# Patient Record
Sex: MALE | Race: WHITE | NOT HISPANIC OR LATINO | Employment: OTHER | URBAN - METROPOLITAN AREA
[De-identification: names, ages, dates, MRNs, and addresses within clinical notes are randomized per-mention and may not be internally consistent; named-entity substitution may affect disease eponyms.]

---

## 2017-01-01 ENCOUNTER — HOSPITAL ENCOUNTER (INPATIENT)
Facility: HOSPITAL | Age: 82
LOS: 4 days | Discharge: ANOTHER HOSPITAL | DRG: 177 | End: 2017-05-01
Attending: EMERGENCY MEDICINE | Admitting: FAMILY MEDICINE
Payer: MEDICARE

## 2017-01-01 ENCOUNTER — TELEPHONE (OUTPATIENT)
Dept: FAMILY MEDICINE | Facility: CLINIC | Age: 82
End: 2017-01-01

## 2017-01-01 ENCOUNTER — PATIENT MESSAGE (OUTPATIENT)
Dept: FAMILY MEDICINE | Facility: CLINIC | Age: 82
End: 2017-01-01

## 2017-01-01 ENCOUNTER — CLINICAL SUPPORT (OUTPATIENT)
Dept: FAMILY MEDICINE | Facility: CLINIC | Age: 82
End: 2017-01-01
Payer: MEDICARE

## 2017-01-01 ENCOUNTER — HOSPITAL ENCOUNTER (INPATIENT)
Facility: HOSPITAL | Age: 82
LOS: 5 days | DRG: 189 | End: 2017-05-06
Attending: HOSPITALIST | Admitting: HOSPITALIST
Payer: MEDICARE

## 2017-01-01 ENCOUNTER — OFFICE VISIT (OUTPATIENT)
Dept: FAMILY MEDICINE | Facility: CLINIC | Age: 82
End: 2017-01-01
Payer: MEDICARE

## 2017-01-01 VITALS
HEART RATE: 68 BPM | DIASTOLIC BLOOD PRESSURE: 70 MMHG | BODY MASS INDEX: 25.12 KG/M2 | SYSTOLIC BLOOD PRESSURE: 126 MMHG | WEIGHT: 185.44 LBS | RESPIRATION RATE: 16 BRPM | HEIGHT: 72 IN

## 2017-01-01 VITALS
HEART RATE: 77 BPM | DIASTOLIC BLOOD PRESSURE: 55 MMHG | SYSTOLIC BLOOD PRESSURE: 97 MMHG | WEIGHT: 182.13 LBS | OXYGEN SATURATION: 58 % | HEIGHT: 72 IN | BODY MASS INDEX: 24.67 KG/M2 | RESPIRATION RATE: 17 BRPM | TEMPERATURE: 98 F

## 2017-01-01 VITALS
HEIGHT: 72 IN | WEIGHT: 190 LBS | BODY MASS INDEX: 25.73 KG/M2 | RESPIRATION RATE: 30 BRPM | TEMPERATURE: 96 F | HEART RATE: 66 BPM | DIASTOLIC BLOOD PRESSURE: 74 MMHG | SYSTOLIC BLOOD PRESSURE: 146 MMHG | OXYGEN SATURATION: 91 %

## 2017-01-01 DIAGNOSIS — J96.01 ACUTE RESPIRATORY FAILURE WITH HYPOXIA: ICD-10-CM

## 2017-01-01 DIAGNOSIS — J18.9 PNEUMONIA OF RIGHT LUNG DUE TO INFECTIOUS ORGANISM, UNSPECIFIED PART OF LUNG: ICD-10-CM

## 2017-01-01 DIAGNOSIS — R00.0 TACHYCARDIA: ICD-10-CM

## 2017-01-01 DIAGNOSIS — R79.89 ELEVATED D-DIMER: ICD-10-CM

## 2017-01-01 DIAGNOSIS — M48.061 LUMBAR STENOSIS: ICD-10-CM

## 2017-01-01 DIAGNOSIS — I10 ESSENTIAL HYPERTENSION: Primary | ICD-10-CM

## 2017-01-01 DIAGNOSIS — J18.9 PNEUMONIA, ORGANISM UNSPECIFIED(486): ICD-10-CM

## 2017-01-01 DIAGNOSIS — R60.0 LEG EDEMA: ICD-10-CM

## 2017-01-01 DIAGNOSIS — R10.9 ABDOMINAL PAIN: ICD-10-CM

## 2017-01-01 DIAGNOSIS — R06.02 SOB (SHORTNESS OF BREATH): ICD-10-CM

## 2017-01-01 DIAGNOSIS — J96.90 RESPIRATORY FAILURE: ICD-10-CM

## 2017-01-01 DIAGNOSIS — E87.1 HYPONATREMIA: Primary | ICD-10-CM

## 2017-01-01 DIAGNOSIS — Z12.5 SCREENING FOR PROSTATE CANCER: ICD-10-CM

## 2017-01-01 DIAGNOSIS — I50.9 CHF (CONGESTIVE HEART FAILURE): ICD-10-CM

## 2017-01-01 DIAGNOSIS — I10 ESSENTIAL HYPERTENSION: ICD-10-CM

## 2017-01-01 DIAGNOSIS — J18.9 PNEUMONIA OF RIGHT LUNG DUE TO INFECTIOUS ORGANISM: ICD-10-CM

## 2017-01-01 LAB
ALBUMIN SERPL BCP-MCNC: 2.3 G/DL
ALBUMIN SERPL BCP-MCNC: 2.4 G/DL
ALBUMIN SERPL BCP-MCNC: 2.4 G/DL
ALBUMIN SERPL BCP-MCNC: 2.5 G/DL
ALBUMIN SERPL BCP-MCNC: 2.6 G/DL
ALBUMIN SERPL BCP-MCNC: 2.8 G/DL
ALBUMIN SERPL BCP-MCNC: 3.8 G/DL
ALLENS TEST: ABNORMAL
ALP SERPL-CCNC: 119 U/L
ALP SERPL-CCNC: 121 U/L
ALP SERPL-CCNC: 122 U/L
ALP SERPL-CCNC: 128 U/L
ALP SERPL-CCNC: 129 U/L
ALP SERPL-CCNC: 130 U/L
ALP SERPL-CCNC: 135 U/L
ALP SERPL-CCNC: 139 U/L
ALP SERPL-CCNC: 141 U/L
ALP SERPL-CCNC: 158 U/L
ALP SERPL-CCNC: 161 U/L
ALP SERPL-CCNC: 178 U/L
ALP SERPL-CCNC: 59 U/L
ALT SERPL W/O P-5'-P-CCNC: 105 U/L
ALT SERPL W/O P-5'-P-CCNC: 118 U/L
ALT SERPL W/O P-5'-P-CCNC: 119 U/L
ALT SERPL W/O P-5'-P-CCNC: 126 U/L
ALT SERPL W/O P-5'-P-CCNC: 133 U/L
ALT SERPL W/O P-5'-P-CCNC: 137 U/L
ALT SERPL W/O P-5'-P-CCNC: 190 U/L
ALT SERPL W/O P-5'-P-CCNC: 23 U/L
ALT SERPL W/O P-5'-P-CCNC: 54 U/L
ALT SERPL W/O P-5'-P-CCNC: 54 U/L
ALT SERPL W/O P-5'-P-CCNC: 71 U/L
ALT SERPL W/O P-5'-P-CCNC: 82 U/L
ALT SERPL W/O P-5'-P-CCNC: 95 U/L
ANA SER QL IF: NORMAL
ANCA AB TITR SER IF: NORMAL TITER
ANION GAP SERPL CALC-SCNC: 11 MMOL/L
ANION GAP SERPL CALC-SCNC: 12 MMOL/L
ANION GAP SERPL CALC-SCNC: 13 MMOL/L
ANION GAP SERPL CALC-SCNC: 8 MMOL/L
ANION GAP SERPL CALC-SCNC: 9 MMOL/L
ANION GAP SERPL CALC-SCNC: 9 MMOL/L
ANISOCYTOSIS BLD QL SMEAR: SLIGHT
ANTI-SSA ANTIBODY: 1.38 EU
ANTI-SSA INTERPRETATION: NEGATIVE
ANTI-SSB ANTIBODY: 0.18 EU
ANTI-SSB INTERPRETATION: NEGATIVE
APTT BLDCRRT: 26.2 SEC
APTT BLDCRRT: 27.9 SEC
AST SERPL-CCNC: 27 U/L
AST SERPL-CCNC: 27 U/L
AST SERPL-CCNC: 37 U/L
AST SERPL-CCNC: 39 U/L
AST SERPL-CCNC: 40 U/L
AST SERPL-CCNC: 42 U/L
AST SERPL-CCNC: 43 U/L
AST SERPL-CCNC: 51 U/L
AST SERPL-CCNC: 55 U/L
AST SERPL-CCNC: 68 U/L
AST SERPL-CCNC: 74 U/L
BACTERIA BLD CULT: NORMAL
BASOPHILS # BLD AUTO: 0.02 K/UL
BASOPHILS # BLD AUTO: 0.03 K/UL
BASOPHILS # BLD AUTO: ABNORMAL K/UL
BASOPHILS NFR BLD: 0 %
BASOPHILS NFR BLD: 0.1 %
BASOPHILS NFR BLD: 0.2 %
BILIRUB DIRECT SERPL-MCNC: 0.3 MG/DL
BILIRUB DIRECT SERPL-MCNC: 0.4 MG/DL
BILIRUB DIRECT SERPL-MCNC: 0.4 MG/DL
BILIRUB DIRECT SERPL-MCNC: 0.5 MG/DL
BILIRUB SERPL-MCNC: 0.5 MG/DL
BILIRUB SERPL-MCNC: 0.5 MG/DL
BILIRUB SERPL-MCNC: 0.6 MG/DL
BILIRUB SERPL-MCNC: 0.8 MG/DL
BILIRUB SERPL-MCNC: 0.9 MG/DL
BILIRUB SERPL-MCNC: 1 MG/DL
BILIRUB UR QL STRIP: NEGATIVE
BILIRUB UR QL STRIP: NEGATIVE
BNP SERPL-MCNC: 344 PG/ML
BNP SERPL-MCNC: 491 PG/ML
BNP SERPL-MCNC: 559 PG/ML
BUN SERPL-MCNC: 11 MG/DL
BUN SERPL-MCNC: 13 MG/DL
BUN SERPL-MCNC: 16 MG/DL
BUN SERPL-MCNC: 18 MG/DL
BUN SERPL-MCNC: 21 MG/DL
BUN SERPL-MCNC: 22 MG/DL
BUN SERPL-MCNC: 23 MG/DL
BUN SERPL-MCNC: 25 MG/DL
BUN SERPL-MCNC: 26 MG/DL
BUN SERPL-MCNC: 27 MG/DL
BUN SERPL-MCNC: 28 MG/DL
BUN SERPL-MCNC: 31 MG/DL
CALCIUM SERPL-MCNC: 7.7 MG/DL
CALCIUM SERPL-MCNC: 7.9 MG/DL
CALCIUM SERPL-MCNC: 8 MG/DL
CALCIUM SERPL-MCNC: 8.1 MG/DL
CALCIUM SERPL-MCNC: 8.2 MG/DL
CALCIUM SERPL-MCNC: 8.3 MG/DL
CALCIUM SERPL-MCNC: 8.3 MG/DL
CALCIUM SERPL-MCNC: 8.4 MG/DL
CALCIUM SERPL-MCNC: 8.5 MG/DL
CALCIUM SERPL-MCNC: 8.6 MG/DL
CALCIUM SERPL-MCNC: 8.8 MG/DL
CALCIUM SERPL-MCNC: 9.4 MG/DL
CCP AB SER IA-ACNC: <0.5 U/ML
CHLORIDE SERPL-SCNC: 103 MMOL/L
CHLORIDE SERPL-SCNC: 83 MMOL/L
CHLORIDE SERPL-SCNC: 84 MMOL/L
CHLORIDE SERPL-SCNC: 85 MMOL/L
CHLORIDE SERPL-SCNC: 88 MMOL/L
CHLORIDE SERPL-SCNC: 89 MMOL/L
CHLORIDE SERPL-SCNC: 91 MMOL/L
CHLORIDE SERPL-SCNC: 91 MMOL/L
CHLORIDE SERPL-SCNC: 92 MMOL/L
CHOLEST/HDLC SERPL: 5.7 {RATIO}
CK MB SERPL-MCNC: 3.3 NG/ML
CK MB SERPL-RTO: 3.4 %
CK SERPL-CCNC: 42 U/L
CK SERPL-CCNC: 97 U/L
CK SERPL-CCNC: 97 U/L
CLARITY UR: CLEAR
CLARITY UR: CLEAR
CO2 SERPL-SCNC: 20 MMOL/L
CO2 SERPL-SCNC: 21 MMOL/L
CO2 SERPL-SCNC: 21 MMOL/L
CO2 SERPL-SCNC: 22 MMOL/L
CO2 SERPL-SCNC: 23 MMOL/L
CO2 SERPL-SCNC: 24 MMOL/L
CO2 SERPL-SCNC: 25 MMOL/L
CO2 SERPL-SCNC: 26 MMOL/L
CO2 SERPL-SCNC: 26 MMOL/L
CO2 SERPL-SCNC: 27 MMOL/L
CO2 SERPL-SCNC: 27 MMOL/L
CO2 SERPL-SCNC: 28 MMOL/L
COLOR UR: YELLOW
COLOR UR: YELLOW
COMPLEXED PSA SERPL-MCNC: 5.3 NG/ML
CREAT SERPL-MCNC: 0.8 MG/DL
CREAT SERPL-MCNC: 0.8 MG/DL
CREAT SERPL-MCNC: 0.9 MG/DL
CREAT SERPL-MCNC: 1 MG/DL
CREAT SERPL-MCNC: 1 MG/DL
CREAT SERPL-MCNC: 1.2 MG/DL
CREAT SERPL-MCNC: 1.2 MG/DL
CREAT SERPL-MCNC: 1.3 MG/DL
CREAT UR-MCNC: 100.4 MG/DL
CREAT UR-MCNC: 9 MG/DL
D DIMER PPP IA.FEU-MCNC: 1.57 MG/L FEU
DACRYOCYTES BLD QL SMEAR: ABNORMAL
DACRYOCYTES BLD QL SMEAR: ABNORMAL
DELSYS: ABNORMAL
DIASTOLIC DYSFUNCTION: YES
DIFFERENTIAL METHOD: ABNORMAL
DSDNA AB SER-ACNC: NORMAL [IU]/ML
ENA JO1 AB SER IA-ACNC: <1 INDEX
EOSINOPHIL # BLD AUTO: 0 K/UL
EOSINOPHIL # BLD AUTO: 0.1 K/UL
EOSINOPHIL # BLD AUTO: 0.2 K/UL
EOSINOPHIL # BLD AUTO: ABNORMAL K/UL
EOSINOPHIL NFR BLD: 0 %
EOSINOPHIL NFR BLD: 0.1 %
EOSINOPHIL NFR BLD: 0.2 %
EOSINOPHIL NFR BLD: 1 %
EOSINOPHIL NFR BLD: 1.3 %
EOSINOPHIL NFR BLD: 2 %
ERYTHROCYTE [DISTWIDTH] IN BLOOD BY AUTOMATED COUNT: 11.2 %
ERYTHROCYTE [DISTWIDTH] IN BLOOD BY AUTOMATED COUNT: 11.2 %
ERYTHROCYTE [DISTWIDTH] IN BLOOD BY AUTOMATED COUNT: 11.4 %
ERYTHROCYTE [DISTWIDTH] IN BLOOD BY AUTOMATED COUNT: 11.4 %
ERYTHROCYTE [DISTWIDTH] IN BLOOD BY AUTOMATED COUNT: 11.7 %
ERYTHROCYTE [DISTWIDTH] IN BLOOD BY AUTOMATED COUNT: 11.9 %
ERYTHROCYTE [DISTWIDTH] IN BLOOD BY AUTOMATED COUNT: 11.9 %
ERYTHROCYTE [DISTWIDTH] IN BLOOD BY AUTOMATED COUNT: 12 %
ERYTHROCYTE [DISTWIDTH] IN BLOOD BY AUTOMATED COUNT: 12.1 %
ERYTHROCYTE [DISTWIDTH] IN BLOOD BY AUTOMATED COUNT: 12.1 %
EST. GFR  (AFRICAN AMERICAN): 57 ML/MIN/1.73 M^2
EST. GFR  (AFRICAN AMERICAN): >60 ML/MIN/1.73 M^2
EST. GFR  (NON AFRICAN AMERICAN): 49 ML/MIN/1.73 M^2
EST. GFR  (NON AFRICAN AMERICAN): 54 ML/MIN/1.73 M^2
EST. GFR  (NON AFRICAN AMERICAN): 54 ML/MIN/1.73 M^2
EST. GFR  (NON AFRICAN AMERICAN): >60 ML/MIN/1.73 M^2
ESTIMATED PA SYSTOLIC PRESSURE: 35.28
GLUCOSE SERPL-MCNC: 105 MG/DL
GLUCOSE SERPL-MCNC: 107 MG/DL
GLUCOSE SERPL-MCNC: 109 MG/DL
GLUCOSE SERPL-MCNC: 112 MG/DL
GLUCOSE SERPL-MCNC: 112 MG/DL
GLUCOSE SERPL-MCNC: 115 MG/DL
GLUCOSE SERPL-MCNC: 117 MG/DL
GLUCOSE SERPL-MCNC: 140 MG/DL
GLUCOSE SERPL-MCNC: 142 MG/DL
GLUCOSE SERPL-MCNC: 205 MG/DL
GLUCOSE SERPL-MCNC: 79 MG/DL
GLUCOSE SERPL-MCNC: 83 MG/DL
GLUCOSE SERPL-MCNC: 89 MG/DL
GLUCOSE SERPL-MCNC: 90 MG/DL
GLUCOSE UR QL STRIP: NEGATIVE
GLUCOSE UR QL STRIP: NEGATIVE
HCO3 UR-SCNC: 22.6 MMOL/L (ref 24–28)
HCT VFR BLD AUTO: 27.6 %
HCT VFR BLD AUTO: 28.5 %
HCT VFR BLD AUTO: 28.8 %
HCT VFR BLD AUTO: 28.8 %
HCT VFR BLD AUTO: 30.4 %
HCT VFR BLD AUTO: 30.4 %
HCT VFR BLD AUTO: 31.2 %
HCT VFR BLD AUTO: 31.7 %
HCT VFR BLD AUTO: 31.9 %
HCT VFR BLD AUTO: 32 %
HCT VFR BLD AUTO: 34.2 %
HCT VFR BLD AUTO: 36.3 %
HDL/CHOLESTEROL RATIO: 17.6 %
HDLC SERPL-MCNC: 261 MG/DL
HDLC SERPL-MCNC: 46 MG/DL
HGB BLD-MCNC: 10.1 G/DL
HGB BLD-MCNC: 10.3 G/DL
HGB BLD-MCNC: 10.5 G/DL
HGB BLD-MCNC: 10.6 G/DL
HGB BLD-MCNC: 10.8 G/DL
HGB BLD-MCNC: 11 G/DL
HGB BLD-MCNC: 11.1 G/DL
HGB BLD-MCNC: 11.3 G/DL
HGB BLD-MCNC: 12.2 G/DL
HGB BLD-MCNC: 12.9 G/DL
HGB UR QL STRIP: NEGATIVE
HGB UR QL STRIP: NEGATIVE
HIV 1+2 AB+HIV1 P24 AG SERPL QL IA: NEGATIVE
HYPOCHROMIA BLD QL SMEAR: ABNORMAL
INR PPP: 1.1
INR PPP: 1.1
KETONES UR QL STRIP: NEGATIVE
KETONES UR QL STRIP: NEGATIVE
L PNEUMO AG UR QL IA: NOT DETECTED
LACTATE SERPL-SCNC: 2.6 MMOL/L
LDLC SERPL CALC-MCNC: 189.2 MG/DL
LEUKOCYTE ESTERASE UR QL STRIP: NEGATIVE
LEUKOCYTE ESTERASE UR QL STRIP: NEGATIVE
LYMPHOCYTES # BLD AUTO: 1.4 K/UL
LYMPHOCYTES # BLD AUTO: 1.9 K/UL
LYMPHOCYTES # BLD AUTO: 2.4 K/UL
LYMPHOCYTES # BLD AUTO: 2.6 K/UL
LYMPHOCYTES # BLD AUTO: 3.1 K/UL
LYMPHOCYTES # BLD AUTO: ABNORMAL K/UL
LYMPHOCYTES NFR BLD: 1 %
LYMPHOCYTES NFR BLD: 11.3 %
LYMPHOCYTES NFR BLD: 12 %
LYMPHOCYTES NFR BLD: 14.3 %
LYMPHOCYTES NFR BLD: 16 %
LYMPHOCYTES NFR BLD: 17 %
LYMPHOCYTES NFR BLD: 3 %
LYMPHOCYTES NFR BLD: 6 %
LYMPHOCYTES NFR BLD: 7 %
LYMPHOCYTES NFR BLD: 9 %
LYMPHOCYTES NFR BLD: 9.4 %
LYMPHOCYTES NFR BLD: 9.8 %
MAGNESIUM SERPL-MCNC: 1.8 MG/DL
MAGNESIUM SERPL-MCNC: 1.9 MG/DL
MAGNESIUM SERPL-MCNC: 1.9 MG/DL
MAGNESIUM SERPL-MCNC: 2 MG/DL
MAGNESIUM SERPL-MCNC: 2.1 MG/DL
MAGNESIUM SERPL-MCNC: 2.1 MG/DL
MAGNESIUM SERPL-MCNC: 2.3 MG/DL
MCH RBC QN AUTO: 31.2 PG
MCH RBC QN AUTO: 31.3 PG
MCH RBC QN AUTO: 31.4 PG
MCH RBC QN AUTO: 31.4 PG
MCH RBC QN AUTO: 31.7 PG
MCH RBC QN AUTO: 31.7 PG
MCH RBC QN AUTO: 31.9 PG
MCH RBC QN AUTO: 32 PG
MCHC RBC AUTO-ENTMCNC: 35 %
MCHC RBC AUTO-ENTMCNC: 35.3 %
MCHC RBC AUTO-ENTMCNC: 35.4 %
MCHC RBC AUTO-ENTMCNC: 35.5 %
MCHC RBC AUTO-ENTMCNC: 35.5 %
MCHC RBC AUTO-ENTMCNC: 35.7 %
MCHC RBC AUTO-ENTMCNC: 36.1 %
MCHC RBC AUTO-ENTMCNC: 36.2 %
MCHC RBC AUTO-ENTMCNC: 36.2 %
MCHC RBC AUTO-ENTMCNC: 36.5 %
MCHC RBC AUTO-ENTMCNC: 36.6 %
MCHC RBC AUTO-ENTMCNC: 36.8 %
MCV RBC AUTO: 87 FL
MCV RBC AUTO: 88 FL
MCV RBC AUTO: 89 FL
MCV RBC AUTO: 90 FL
MONOCYTES # BLD AUTO: 1.4 K/UL
MONOCYTES # BLD AUTO: 2.1 K/UL
MONOCYTES # BLD AUTO: 2.2 K/UL
MONOCYTES # BLD AUTO: 2.4 K/UL
MONOCYTES # BLD AUTO: 2.7 K/UL
MONOCYTES # BLD AUTO: ABNORMAL K/UL
MONOCYTES NFR BLD: 0 %
MONOCYTES NFR BLD: 10 %
MONOCYTES NFR BLD: 11 %
MONOCYTES NFR BLD: 15 %
MONOCYTES NFR BLD: 20.2 %
MONOCYTES NFR BLD: 3 %
MONOCYTES NFR BLD: 6 %
MONOCYTES NFR BLD: 6 %
MONOCYTES NFR BLD: 6.9 %
MONOCYTES NFR BLD: 7.9 %
MONOCYTES NFR BLD: 8.7 %
MONOCYTES NFR BLD: 8.8 %
NEUTROPHILS # BLD AUTO: 17.2 K/UL
NEUTROPHILS # BLD AUTO: 19.7 K/UL
NEUTROPHILS # BLD AUTO: 21.6 K/UL
NEUTROPHILS # BLD AUTO: 22.3 K/UL
NEUTROPHILS # BLD AUTO: 8.7 K/UL
NEUTROPHILS NFR BLD: 65 %
NEUTROPHILS NFR BLD: 65.2 %
NEUTROPHILS NFR BLD: 72 %
NEUTROPHILS NFR BLD: 74 %
NEUTROPHILS NFR BLD: 79.7 %
NEUTROPHILS NFR BLD: 81.2 %
NEUTROPHILS NFR BLD: 82.6 %
NEUTROPHILS NFR BLD: 83 %
NEUTROPHILS NFR BLD: 86 %
NEUTROPHILS NFR BLD: 86 %
NEUTROPHILS NFR BLD: 88 %
NEUTROPHILS NFR BLD: 96 %
NEUTS BAND NFR BLD MANUAL: 1 %
NEUTS BAND NFR BLD MANUAL: 1 %
NEUTS BAND NFR BLD MANUAL: 14 %
NEUTS BAND NFR BLD MANUAL: 2 %
NITRITE UR QL STRIP: NEGATIVE
NITRITE UR QL STRIP: NEGATIVE
NONHDLC SERPL-MCNC: 215 MG/DL
OSMOLALITY SERPL: 254 MOSM/KG
OSMOLALITY UR: 479 MOSM/KG
OVALOCYTES BLD QL SMEAR: ABNORMAL
P-ANCA TITR SER IF: NORMAL TITER
PCO2 BLDA: 31.7 MMHG (ref 35–45)
PH SMN: 7.46 [PH] (ref 7.35–7.45)
PH UR STRIP: 6 [PH] (ref 5–8)
PH UR STRIP: 7 [PH] (ref 5–8)
PHOSPHATE SERPL-MCNC: 3.3 MG/DL
PHOSPHATE SERPL-MCNC: 3.8 MG/DL
PHOSPHATE SERPL-MCNC: 4 MG/DL
PHOSPHATE SERPL-MCNC: 4.1 MG/DL
PHOSPHATE SERPL-MCNC: 4.5 MG/DL
PHOSPHATE SERPL-MCNC: 4.5 MG/DL
PLATELET # BLD AUTO: 302 K/UL
PLATELET # BLD AUTO: 304 K/UL
PLATELET # BLD AUTO: 309 K/UL
PLATELET # BLD AUTO: 327 K/UL
PLATELET # BLD AUTO: 356 K/UL
PLATELET # BLD AUTO: 367 K/UL
PLATELET # BLD AUTO: 370 K/UL
PLATELET # BLD AUTO: 377 K/UL
PLATELET # BLD AUTO: 423 K/UL
PLATELET # BLD AUTO: 434 K/UL
PLATELET # BLD AUTO: 475 K/UL
PLATELET # BLD AUTO: 475 K/UL
PLATELET BLD QL SMEAR: ABNORMAL
PMV BLD AUTO: 8.2 FL
PMV BLD AUTO: 8.3 FL
PMV BLD AUTO: 8.3 FL
PMV BLD AUTO: 8.4 FL
PMV BLD AUTO: 8.5 FL
PMV BLD AUTO: 8.6 FL
PMV BLD AUTO: 8.6 FL
PMV BLD AUTO: 8.7 FL
PO2 BLDA: 47 MMHG (ref 80–100)
POC BE: -1 MMOL/L
POC SATURATED O2: 86 % (ref 95–100)
POC TCO2: 24 MMOL/L (ref 23–27)
POIKILOCYTOSIS BLD QL SMEAR: SLIGHT
POTASSIUM SERPL-SCNC: 3.3 MMOL/L
POTASSIUM SERPL-SCNC: 3.3 MMOL/L
POTASSIUM SERPL-SCNC: 3.6 MMOL/L
POTASSIUM SERPL-SCNC: 3.6 MMOL/L
POTASSIUM SERPL-SCNC: 3.7 MMOL/L
POTASSIUM SERPL-SCNC: 3.7 MMOL/L
POTASSIUM SERPL-SCNC: 3.9 MMOL/L
POTASSIUM SERPL-SCNC: 4 MMOL/L
POTASSIUM SERPL-SCNC: 4.1 MMOL/L
POTASSIUM SERPL-SCNC: 4.6 MMOL/L
POTASSIUM SERPL-SCNC: 4.6 MMOL/L
POTASSIUM SERPL-SCNC: 4.7 MMOL/L
POTASSIUM SERPL-SCNC: 4.9 MMOL/L
POTASSIUM SERPL-SCNC: 5.1 MMOL/L
PROT SERPL-MCNC: 5.5 G/DL
PROT SERPL-MCNC: 5.6 G/DL
PROT SERPL-MCNC: 5.8 G/DL
PROT SERPL-MCNC: 5.9 G/DL
PROT SERPL-MCNC: 5.9 G/DL
PROT SERPL-MCNC: 6 G/DL
PROT SERPL-MCNC: 6.1 G/DL
PROT SERPL-MCNC: 6.1 G/DL
PROT SERPL-MCNC: 6.2 G/DL
PROT SERPL-MCNC: 6.5 G/DL
PROT SERPL-MCNC: 6.7 G/DL
PROT UR QL STRIP: NEGATIVE
PROT UR QL STRIP: NEGATIVE
PROTHROMBIN TIME: 10.9 SEC
PROTHROMBIN TIME: 11.2 SEC
RBC # BLD AUTO: 3.17 M/UL
RBC # BLD AUTO: 3.28 M/UL
RBC # BLD AUTO: 3.31 M/UL
RBC # BLD AUTO: 3.31 M/UL
RBC # BLD AUTO: 3.44 M/UL
RBC # BLD AUTO: 3.44 M/UL
RBC # BLD AUTO: 3.53 M/UL
RBC # BLD AUTO: 3.56 M/UL
RBC # BLD AUTO: 3.56 M/UL
RBC # BLD AUTO: 3.61 M/UL
RBC # BLD AUTO: 3.82 M/UL
RBC # BLD AUTO: 4.04 M/UL
RETIRED EF AND QEF - SEE NOTES: 60 (ref 55–65)
RHEUMATOID FACT SERPL-ACNC: 12 IU/ML
SAMPLE: ABNORMAL
SITE: ABNORMAL
SODIUM SERPL-SCNC: 116 MMOL/L
SODIUM SERPL-SCNC: 121 MMOL/L
SODIUM SERPL-SCNC: 122 MMOL/L
SODIUM SERPL-SCNC: 123 MMOL/L
SODIUM SERPL-SCNC: 124 MMOL/L
SODIUM SERPL-SCNC: 124 MMOL/L
SODIUM SERPL-SCNC: 125 MMOL/L
SODIUM SERPL-SCNC: 125 MMOL/L
SODIUM SERPL-SCNC: 127 MMOL/L
SODIUM SERPL-SCNC: 127 MMOL/L
SODIUM SERPL-SCNC: 129 MMOL/L
SODIUM SERPL-SCNC: 138 MMOL/L
SODIUM UR-SCNC: 48 MMOL/L
SP GR UR STRIP: 1.01 (ref 1–1.03)
SP GR UR STRIP: <=1.005 (ref 1–1.03)
SPHEROCYTES BLD QL SMEAR: ABNORMAL
STOMATOCYTES BLD QL SMEAR: PRESENT
TRICUSPID VALVE REGURGITATION: ABNORMAL
TRIGL SERPL-MCNC: 129 MG/DL
TROPONIN I SERPL DL<=0.01 NG/ML-MCNC: 0.01 NG/ML
TROPONIN I SERPL DL<=0.01 NG/ML-MCNC: <0.006 NG/ML
TSH SERPL DL<=0.005 MIU/L-ACNC: 1.61 UIU/ML
TSH SERPL DL<=0.005 MIU/L-ACNC: 3.61 UIU/ML
URN SPEC COLLECT METH UR: ABNORMAL
URN SPEC COLLECT METH UR: NORMAL
UROBILINOGEN UR STRIP-ACNC: 1 EU/DL
UROBILINOGEN UR STRIP-ACNC: 1 EU/DL
VANCOMYCIN TROUGH SERPL-MCNC: 18.3 UG/ML
VANCOMYCIN TROUGH SERPL-MCNC: 22.9 UG/ML
WBC # BLD AUTO: 11.13 K/UL
WBC # BLD AUTO: 12.62 K/UL
WBC # BLD AUTO: 13.55 K/UL
WBC # BLD AUTO: 20 K/UL
WBC # BLD AUTO: 24.61 K/UL
WBC # BLD AUTO: 25.15 K/UL
WBC # BLD AUTO: 27.24 K/UL
WBC # BLD AUTO: 27.37 K/UL
WBC # BLD AUTO: 30.2 K/UL
WBC # BLD AUTO: 33.28 K/UL
WBC # BLD AUTO: 34.45 K/UL
WBC # BLD AUTO: 41.76 K/UL
WBC TOXIC VACUOLES BLD QL SMEAR: PRESENT

## 2017-01-01 PROCEDURE — 83930 ASSAY OF BLOOD OSMOLALITY: CPT

## 2017-01-01 PROCEDURE — 83605 ASSAY OF LACTIC ACID: CPT

## 2017-01-01 PROCEDURE — 85027 COMPLETE CBC AUTOMATED: CPT

## 2017-01-01 PROCEDURE — 97535 SELF CARE MNGMENT TRAINING: CPT

## 2017-01-01 PROCEDURE — 25000242 PHARM REV CODE 250 ALT 637 W/ HCPCS: Performed by: HOSPITALIST

## 2017-01-01 PROCEDURE — 87040 BLOOD CULTURE FOR BACTERIA: CPT | Mod: 59

## 2017-01-01 PROCEDURE — 20000000 HC ICU ROOM

## 2017-01-01 PROCEDURE — G8978 MOBILITY CURRENT STATUS: HCPCS | Mod: CK

## 2017-01-01 PROCEDURE — 85007 BL SMEAR W/DIFF WBC COUNT: CPT

## 2017-01-01 PROCEDURE — 25000003 PHARM REV CODE 250: Performed by: INTERNAL MEDICINE

## 2017-01-01 PROCEDURE — 1160F RVW MEDS BY RX/DR IN RCRD: CPT | Performed by: FAMILY MEDICINE

## 2017-01-01 PROCEDURE — 25000003 PHARM REV CODE 250: Performed by: FAMILY MEDICINE

## 2017-01-01 PROCEDURE — 36415 COLL VENOUS BLD VENIPUNCTURE: CPT

## 2017-01-01 PROCEDURE — 81003 URINALYSIS AUTO W/O SCOPE: CPT

## 2017-01-01 PROCEDURE — 27000221 HC OXYGEN, UP TO 24 HOURS

## 2017-01-01 PROCEDURE — 83880 ASSAY OF NATRIURETIC PEPTIDE: CPT

## 2017-01-01 PROCEDURE — 84100 ASSAY OF PHOSPHORUS: CPT

## 2017-01-01 PROCEDURE — 85730 THROMBOPLASTIN TIME PARTIAL: CPT

## 2017-01-01 PROCEDURE — 63600175 PHARM REV CODE 636 W HCPCS: Performed by: NURSE PRACTITIONER

## 2017-01-01 PROCEDURE — 11000001 HC ACUTE MED/SURG PRIVATE ROOM

## 2017-01-01 PROCEDURE — 63600175 PHARM REV CODE 636 W HCPCS: Performed by: INTERNAL MEDICINE

## 2017-01-01 PROCEDURE — 80076 HEPATIC FUNCTION PANEL: CPT

## 2017-01-01 PROCEDURE — 84484 ASSAY OF TROPONIN QUANT: CPT

## 2017-01-01 PROCEDURE — 27000339 *HC DAILY SUPPLY KIT

## 2017-01-01 PROCEDURE — 86235 NUCLEAR ANTIGEN ANTIBODY: CPT

## 2017-01-01 PROCEDURE — 93005 ELECTROCARDIOGRAM TRACING: CPT

## 2017-01-01 PROCEDURE — 94640 AIRWAY INHALATION TREATMENT: CPT

## 2017-01-01 PROCEDURE — 25000003 PHARM REV CODE 250: Performed by: HOSPITALIST

## 2017-01-01 PROCEDURE — 96367 TX/PROPH/DG ADDL SEQ IV INF: CPT

## 2017-01-01 PROCEDURE — 99900035 HC TECH TIME PER 15 MIN (STAT)

## 2017-01-01 PROCEDURE — 27000190 HC CPAP FULL FACE MASK W/VALVE

## 2017-01-01 PROCEDURE — 94660 CPAP INITIATION&MGMT: CPT

## 2017-01-01 PROCEDURE — 25000003 PHARM REV CODE 250: Performed by: EMERGENCY MEDICINE

## 2017-01-01 PROCEDURE — 85379 FIBRIN DEGRADATION QUANT: CPT

## 2017-01-01 PROCEDURE — 80202 ASSAY OF VANCOMYCIN: CPT

## 2017-01-01 PROCEDURE — 36415 COLL VENOUS BLD VENIPUNCTURE: CPT | Mod: PBBFAC

## 2017-01-01 PROCEDURE — 1157F ADVNC CARE PLAN IN RCRD: CPT | Mod: 8P | Performed by: FAMILY MEDICINE

## 2017-01-01 PROCEDURE — 80053 COMPREHEN METABOLIC PANEL: CPT

## 2017-01-01 PROCEDURE — 94761 N-INVAS EAR/PLS OXIMETRY MLT: CPT

## 2017-01-01 PROCEDURE — 80048 BASIC METABOLIC PNL TOTAL CA: CPT

## 2017-01-01 PROCEDURE — 99214 OFFICE O/P EST MOD 30 MIN: CPT | Mod: S$PBB | Performed by: FAMILY MEDICINE

## 2017-01-01 PROCEDURE — 82553 CREATINE MB FRACTION: CPT

## 2017-01-01 PROCEDURE — 97161 PT EVAL LOW COMPLEX 20 MIN: CPT

## 2017-01-01 PROCEDURE — 80048 BASIC METABOLIC PNL TOTAL CA: CPT | Mod: 91

## 2017-01-01 PROCEDURE — 25000003 PHARM REV CODE 250: Performed by: NURSE PRACTITIONER

## 2017-01-01 PROCEDURE — 27100171 HC OXYGEN HIGH FLOW UP TO 24 HOURS

## 2017-01-01 PROCEDURE — 84484 ASSAY OF TROPONIN QUANT: CPT | Mod: 91

## 2017-01-01 PROCEDURE — 63600175 PHARM REV CODE 636 W HCPCS: Performed by: EMERGENCY MEDICINE

## 2017-01-01 PROCEDURE — 85025 COMPLETE CBC W/AUTO DIFF WBC: CPT

## 2017-01-01 PROCEDURE — 85027 COMPLETE CBC AUTOMATED: CPT | Mod: 91

## 2017-01-01 PROCEDURE — 83735 ASSAY OF MAGNESIUM: CPT

## 2017-01-01 PROCEDURE — 25000242 PHARM REV CODE 250 ALT 637 W/ HCPCS: Performed by: INTERNAL MEDICINE

## 2017-01-01 PROCEDURE — 99233 SBSQ HOSP IP/OBS HIGH 50: CPT | Mod: ,,, | Performed by: INTERNAL MEDICINE

## 2017-01-01 PROCEDURE — 63600175 PHARM REV CODE 636 W HCPCS: Performed by: HOSPITALIST

## 2017-01-01 PROCEDURE — 99999 PR PBB SHADOW E&M-EST. PATIENT-LVL II: CPT | Mod: PBBFAC,,, | Performed by: FAMILY MEDICINE

## 2017-01-01 PROCEDURE — 82803 BLOOD GASES ANY COMBINATION: CPT

## 2017-01-01 PROCEDURE — 96375 TX/PRO/DX INJ NEW DRUG ADDON: CPT

## 2017-01-01 PROCEDURE — 86703 HIV-1/HIV-2 1 RESULT ANTBDY: CPT

## 2017-01-01 PROCEDURE — C9113 INJ PANTOPRAZOLE SODIUM, VIA: HCPCS | Performed by: INTERNAL MEDICINE

## 2017-01-01 PROCEDURE — 99223 1ST HOSP IP/OBS HIGH 75: CPT | Mod: ,,, | Performed by: FAMILY MEDICINE

## 2017-01-01 PROCEDURE — 63600175 PHARM REV CODE 636 W HCPCS: Performed by: FAMILY MEDICINE

## 2017-01-01 PROCEDURE — 25500020 PHARM REV CODE 255: Performed by: FAMILY MEDICINE

## 2017-01-01 PROCEDURE — 82570 ASSAY OF URINE CREATININE: CPT

## 2017-01-01 PROCEDURE — 96361 HYDRATE IV INFUSION ADD-ON: CPT

## 2017-01-01 PROCEDURE — 99900031 HC PATIENT EDUCATION (STAT)

## 2017-01-01 PROCEDURE — 86255 FLUORESCENT ANTIBODY SCREEN: CPT | Mod: 91

## 2017-01-01 PROCEDURE — 99212 OFFICE O/P EST SF 10 MIN: CPT | Mod: PBBFAC | Performed by: FAMILY MEDICINE

## 2017-01-01 PROCEDURE — 87040 BLOOD CULTURE FOR BACTERIA: CPT

## 2017-01-01 PROCEDURE — 86038 ANTINUCLEAR ANTIBODIES: CPT

## 2017-01-01 PROCEDURE — 86235 NUCLEAR ANTIGEN ANTIBODY: CPT | Mod: 59

## 2017-01-01 PROCEDURE — G0378 HOSPITAL OBSERVATION PER HR: HCPCS

## 2017-01-01 PROCEDURE — 99285 EMERGENCY DEPT VISIT HI MDM: CPT | Mod: 25

## 2017-01-01 PROCEDURE — 86431 RHEUMATOID FACTOR QUANT: CPT

## 2017-01-01 PROCEDURE — 94760 N-INVAS EAR/PLS OXIMETRY 1: CPT

## 2017-01-01 PROCEDURE — 86225 DNA ANTIBODY NATIVE: CPT

## 2017-01-01 PROCEDURE — 51702 INSERT TEMP BLADDER CATH: CPT

## 2017-01-01 PROCEDURE — 87449 NOS EACH ORGANISM AG IA: CPT

## 2017-01-01 PROCEDURE — G8979 MOBILITY GOAL STATUS: HCPCS | Mod: CI

## 2017-01-01 PROCEDURE — 94799 UNLISTED PULMONARY SVC/PX: CPT

## 2017-01-01 PROCEDURE — 84443 ASSAY THYROID STIM HORMONE: CPT

## 2017-01-01 PROCEDURE — 80061 LIPID PANEL: CPT

## 2017-01-01 PROCEDURE — 85610 PROTHROMBIN TIME: CPT

## 2017-01-01 PROCEDURE — 96365 THER/PROPH/DIAG IV INF INIT: CPT

## 2017-01-01 PROCEDURE — 83935 ASSAY OF URINE OSMOLALITY: CPT

## 2017-01-01 PROCEDURE — 1159F MED LIST DOCD IN RCRD: CPT | Performed by: FAMILY MEDICINE

## 2017-01-01 PROCEDURE — 99232 SBSQ HOSP IP/OBS MODERATE 35: CPT | Mod: ,,, | Performed by: INTERNAL MEDICINE

## 2017-01-01 PROCEDURE — 86200 CCP ANTIBODY: CPT

## 2017-01-01 PROCEDURE — 97166 OT EVAL MOD COMPLEX 45 MIN: CPT

## 2017-01-01 PROCEDURE — 85007 BL SMEAR W/DIFF WBC COUNT: CPT | Mod: 91

## 2017-01-01 PROCEDURE — 27200120 HC KIT IV START (RUSH ONLY)

## 2017-01-01 PROCEDURE — 84300 ASSAY OF URINE SODIUM: CPT

## 2017-01-01 PROCEDURE — 83735 ASSAY OF MAGNESIUM: CPT | Mod: 91

## 2017-01-01 PROCEDURE — 82550 ASSAY OF CK (CPK): CPT

## 2017-01-01 PROCEDURE — 84153 ASSAY OF PSA TOTAL: CPT

## 2017-01-01 PROCEDURE — 97110 THERAPEUTIC EXERCISES: CPT | Performed by: PHYSICAL THERAPIST

## 2017-01-01 PROCEDURE — 36600 WITHDRAWAL OF ARTERIAL BLOOD: CPT

## 2017-01-01 PROCEDURE — 93306 TTE W/DOPPLER COMPLETE: CPT

## 2017-01-01 PROCEDURE — 93010 ELECTROCARDIOGRAM REPORT: CPT | Mod: ,,, | Performed by: INTERNAL MEDICINE

## 2017-01-01 RX ORDER — HEPARIN SODIUM 5000 [USP'U]/ML
5000 INJECTION, SOLUTION INTRAVENOUS; SUBCUTANEOUS EVERY 12 HOURS
Status: DISCONTINUED | OUTPATIENT
Start: 2017-01-01 | End: 2017-01-01 | Stop reason: HOSPADM

## 2017-01-01 RX ORDER — FUROSEMIDE 10 MG/ML
20 INJECTION INTRAMUSCULAR; INTRAVENOUS 2 TIMES DAILY
Status: DISCONTINUED | OUTPATIENT
Start: 2017-01-01 | End: 2017-01-01 | Stop reason: HOSPADM

## 2017-01-01 RX ORDER — FUROSEMIDE 10 MG/ML
40 INJECTION INTRAMUSCULAR; INTRAVENOUS
Status: COMPLETED | OUTPATIENT
Start: 2017-01-01 | End: 2017-01-01

## 2017-01-01 RX ORDER — LOSARTAN POTASSIUM 25 MG/1
25 TABLET ORAL DAILY
Status: DISCONTINUED | OUTPATIENT
Start: 2017-01-01 | End: 2017-01-01 | Stop reason: HOSPADM

## 2017-01-01 RX ORDER — LORAZEPAM 1 MG/1
1 TABLET ORAL ONCE
Status: COMPLETED | OUTPATIENT
Start: 2017-01-01 | End: 2017-01-01

## 2017-01-01 RX ORDER — SODIUM CHLORIDE 9 MG/ML
INJECTION, SOLUTION INTRAVENOUS CONTINUOUS
Status: DISCONTINUED | OUTPATIENT
Start: 2017-01-01 | End: 2017-01-01

## 2017-01-01 RX ORDER — IPRATROPIUM BROMIDE AND ALBUTEROL SULFATE 2.5; .5 MG/3ML; MG/3ML
3 SOLUTION RESPIRATORY (INHALATION) EVERY 4 HOURS
Status: DISCONTINUED | OUTPATIENT
Start: 2017-01-01 | End: 2017-01-01 | Stop reason: HOSPADM

## 2017-01-01 RX ORDER — MONTELUKAST SODIUM 10 MG/1
10 TABLET ORAL DAILY
Status: DISCONTINUED | OUTPATIENT
Start: 2017-01-01 | End: 2017-01-01

## 2017-01-01 RX ORDER — LEVALBUTEROL 1.25 MG/.5ML
1.25 SOLUTION, CONCENTRATE RESPIRATORY (INHALATION)
Status: DISCONTINUED | OUTPATIENT
Start: 2017-01-01 | End: 2017-01-01

## 2017-01-01 RX ORDER — BENZONATATE 100 MG/1
100 CAPSULE ORAL 3 TIMES DAILY PRN
Status: DISCONTINUED | OUTPATIENT
Start: 2017-01-01 | End: 2017-01-01 | Stop reason: HOSPADM

## 2017-01-01 RX ORDER — FUROSEMIDE 10 MG/ML
60 INJECTION INTRAMUSCULAR; INTRAVENOUS ONCE
Status: COMPLETED | OUTPATIENT
Start: 2017-01-01 | End: 2017-01-01

## 2017-01-01 RX ORDER — POLYETHYLENE GLYCOL 3350 17 G/17G
17 POWDER, FOR SOLUTION ORAL 2 TIMES DAILY
Status: DISCONTINUED | OUTPATIENT
Start: 2017-01-01 | End: 2017-01-01 | Stop reason: HOSPADM

## 2017-01-01 RX ORDER — MAG HYDROX/ALUMINUM HYD/SIMETH 200-200-20
30 SUSPENSION, ORAL (FINAL DOSE FORM) ORAL EVERY 6 HOURS PRN
Status: DISCONTINUED | OUTPATIENT
Start: 2017-01-01 | End: 2017-01-01 | Stop reason: HOSPADM

## 2017-01-01 RX ORDER — ONDANSETRON 2 MG/ML
4 INJECTION INTRAMUSCULAR; INTRAVENOUS EVERY 6 HOURS PRN
Status: DISCONTINUED | OUTPATIENT
Start: 2017-01-01 | End: 2017-01-01 | Stop reason: HOSPADM

## 2017-01-01 RX ORDER — ENOXAPARIN SODIUM 100 MG/ML
40 INJECTION SUBCUTANEOUS EVERY 24 HOURS
Status: DISCONTINUED | OUTPATIENT
Start: 2017-01-01 | End: 2017-01-01 | Stop reason: HOSPADM

## 2017-01-01 RX ORDER — FUROSEMIDE 10 MG/ML
40 INJECTION INTRAMUSCULAR; INTRAVENOUS 2 TIMES DAILY
Status: DISCONTINUED | OUTPATIENT
Start: 2017-01-01 | End: 2017-01-01

## 2017-01-01 RX ORDER — METHYLPREDNISOLONE SOD SUCC 125 MG
80 VIAL (EA) INJECTION DAILY
Status: DISCONTINUED | OUTPATIENT
Start: 2017-01-01 | End: 2017-01-01 | Stop reason: HOSPADM

## 2017-01-01 RX ORDER — ACETAMINOPHEN 325 MG/1
650 TABLET ORAL EVERY 4 HOURS PRN
Status: DISCONTINUED | OUTPATIENT
Start: 2017-01-01 | End: 2017-01-01 | Stop reason: HOSPADM

## 2017-01-01 RX ORDER — AMLODIPINE BESYLATE 5 MG/1
10 TABLET ORAL DAILY
Status: DISCONTINUED | OUTPATIENT
Start: 2017-01-01 | End: 2017-01-01 | Stop reason: HOSPADM

## 2017-01-01 RX ORDER — POTASSIUM CHLORIDE 20 MEQ/1
40 TABLET, EXTENDED RELEASE ORAL EVERY 4 HOURS
Status: COMPLETED | OUTPATIENT
Start: 2017-01-01 | End: 2017-01-01

## 2017-01-01 RX ORDER — PREDNISONE 20 MG/1
40 TABLET ORAL DAILY
Status: DISCONTINUED | OUTPATIENT
Start: 2017-01-01 | End: 2017-01-01

## 2017-01-01 RX ORDER — FUROSEMIDE 10 MG/ML
40 INJECTION INTRAMUSCULAR; INTRAVENOUS 3 TIMES DAILY
Status: DISCONTINUED | OUTPATIENT
Start: 2017-01-01 | End: 2017-01-01

## 2017-01-01 RX ORDER — AMLODIPINE BESYLATE 10 MG/1
10 TABLET ORAL DAILY
Status: DISCONTINUED | OUTPATIENT
Start: 2017-01-01 | End: 2017-01-01 | Stop reason: HOSPADM

## 2017-01-01 RX ORDER — LOSARTAN POTASSIUM 100 MG/1
TABLET ORAL
Qty: 90 TABLET | Refills: 0 | Status: SHIPPED | OUTPATIENT
Start: 2017-01-01

## 2017-01-01 RX ORDER — FUROSEMIDE 10 MG/ML
20 INJECTION INTRAMUSCULAR; INTRAVENOUS ONCE
Status: COMPLETED | OUTPATIENT
Start: 2017-01-01 | End: 2017-01-01

## 2017-01-01 RX ORDER — PREDNISONE 20 MG/1
60 TABLET ORAL DAILY
Status: DISCONTINUED | OUTPATIENT
Start: 2017-01-01 | End: 2017-01-01 | Stop reason: HOSPADM

## 2017-01-01 RX ORDER — FUROSEMIDE 10 MG/ML
60 INJECTION INTRAMUSCULAR; INTRAVENOUS 3 TIMES DAILY
Status: DISCONTINUED | OUTPATIENT
Start: 2017-01-01 | End: 2017-01-01

## 2017-01-01 RX ORDER — PANTOPRAZOLE SODIUM 40 MG/10ML
40 INJECTION, POWDER, LYOPHILIZED, FOR SOLUTION INTRAVENOUS DAILY
Status: DISCONTINUED | OUTPATIENT
Start: 2017-01-01 | End: 2017-01-01 | Stop reason: HOSPADM

## 2017-01-01 RX ORDER — LACTULOSE 10 G/15ML
30 SOLUTION ORAL 2 TIMES DAILY PRN
Status: DISCONTINUED | OUTPATIENT
Start: 2017-01-01 | End: 2017-01-01 | Stop reason: HOSPADM

## 2017-01-01 RX ORDER — METOPROLOL TARTRATE 25 MG/1
25 TABLET, FILM COATED ORAL 2 TIMES DAILY
Status: DISCONTINUED | OUTPATIENT
Start: 2017-01-01 | End: 2017-01-01 | Stop reason: HOSPADM

## 2017-01-01 RX ORDER — METHYLPREDNISOLONE SOD SUCC 125 MG
80 VIAL (EA) INJECTION DAILY
Status: DISCONTINUED | OUTPATIENT
Start: 2017-01-01 | End: 2017-01-01

## 2017-01-01 RX ORDER — AMOXICILLIN 250 MG
1 CAPSULE ORAL 2 TIMES DAILY
Status: DISCONTINUED | OUTPATIENT
Start: 2017-01-01 | End: 2017-01-01 | Stop reason: HOSPADM

## 2017-01-01 RX ORDER — IPRATROPIUM BROMIDE AND ALBUTEROL SULFATE 2.5; .5 MG/3ML; MG/3ML
3 SOLUTION RESPIRATORY (INHALATION) EVERY 6 HOURS
Status: DISCONTINUED | OUTPATIENT
Start: 2017-01-01 | End: 2017-01-01 | Stop reason: HOSPADM

## 2017-01-01 RX ORDER — FUROSEMIDE 10 MG/ML
40 INJECTION INTRAMUSCULAR; INTRAVENOUS ONCE
Status: COMPLETED | OUTPATIENT
Start: 2017-01-01 | End: 2017-01-01

## 2017-01-01 RX ORDER — AMLODIPINE BESYLATE 10 MG/1
TABLET ORAL
Qty: 90 TABLET | Refills: 0 | Status: SHIPPED | OUTPATIENT
Start: 2017-01-01

## 2017-01-01 RX ORDER — SODIUM CHLORIDE 0.9 % (FLUSH) 0.9 %
3 SYRINGE (ML) INJECTION EVERY 8 HOURS
Status: DISCONTINUED | OUTPATIENT
Start: 2017-01-01 | End: 2017-01-01 | Stop reason: HOSPADM

## 2017-01-01 RX ORDER — POTASSIUM CHLORIDE 20 MEQ/15ML
40 SOLUTION ORAL ONCE
Status: COMPLETED | OUTPATIENT
Start: 2017-01-01 | End: 2017-01-01

## 2017-01-01 RX ORDER — ONDANSETRON 2 MG/ML
4 INJECTION INTRAMUSCULAR; INTRAVENOUS EVERY 12 HOURS PRN
Status: DISCONTINUED | OUTPATIENT
Start: 2017-01-01 | End: 2017-01-01 | Stop reason: HOSPADM

## 2017-01-01 RX ORDER — METHYLPREDNISOLONE SOD SUCC 125 MG
125 VIAL (EA) INJECTION ONCE
Status: COMPLETED | OUTPATIENT
Start: 2017-01-01 | End: 2017-01-01

## 2017-01-01 RX ORDER — MONTELUKAST SODIUM 10 MG/1
10 TABLET ORAL DAILY
Status: DISCONTINUED | OUTPATIENT
Start: 2017-01-01 | End: 2017-01-01 | Stop reason: HOSPADM

## 2017-01-01 RX ORDER — NAPROXEN 500 MG/1
500 TABLET ORAL 2 TIMES DAILY PRN
Qty: 60 TABLET | Refills: 2 | Status: SHIPPED | OUTPATIENT
Start: 2017-01-01

## 2017-01-01 RX ORDER — IBUPROFEN 200 MG
200 TABLET ORAL 3 TIMES DAILY
Status: DISCONTINUED | OUTPATIENT
Start: 2017-01-01 | End: 2017-01-01

## 2017-01-01 RX ORDER — IBUPROFEN 200 MG
200 TABLET ORAL 3 TIMES DAILY
Status: DISCONTINUED | OUTPATIENT
Start: 2017-01-01 | End: 2017-01-01 | Stop reason: HOSPADM

## 2017-01-01 RX ORDER — CEFTRIAXONE 1 G/50ML
1 INJECTION, SOLUTION INTRAVENOUS
Status: DISCONTINUED | OUTPATIENT
Start: 2017-01-01 | End: 2017-01-01

## 2017-01-01 RX ORDER — LOSARTAN POTASSIUM 50 MG/1
100 TABLET ORAL DAILY
Status: DISCONTINUED | OUTPATIENT
Start: 2017-01-01 | End: 2017-01-01 | Stop reason: HOSPADM

## 2017-01-01 RX ORDER — ACETAMINOPHEN 325 MG/1
650 TABLET ORAL EVERY 8 HOURS PRN
Status: DISCONTINUED | OUTPATIENT
Start: 2017-01-01 | End: 2017-01-01 | Stop reason: HOSPADM

## 2017-01-01 RX ADMIN — AZITHROMYCIN MONOHYDRATE 500 MG: 500 INJECTION, POWDER, LYOPHILIZED, FOR SOLUTION INTRAVENOUS at 05:05

## 2017-01-01 RX ADMIN — HEPARIN SODIUM 5000 UNITS: 5000 INJECTION, SOLUTION INTRAVENOUS; SUBCUTANEOUS at 09:05

## 2017-01-01 RX ADMIN — LACTULOSE 30 G: 10 SOLUTION ORAL at 09:04

## 2017-01-01 RX ADMIN — VANCOMYCIN HYDROCHLORIDE 1500 MG: 1 INJECTION, POWDER, LYOPHILIZED, FOR SOLUTION INTRAVENOUS at 09:04

## 2017-01-01 RX ADMIN — LOSARTAN POTASSIUM 100 MG: 50 TABLET, FILM COATED ORAL at 08:04

## 2017-01-01 RX ADMIN — PIPERACILLIN SODIUM AND TAZOBACTAM SODIUM 4.5 G: 4; .5 INJECTION, POWDER, LYOPHILIZED, FOR SOLUTION INTRAVENOUS at 11:05

## 2017-01-01 RX ADMIN — FUROSEMIDE 60 MG: 10 INJECTION, SOLUTION INTRAMUSCULAR; INTRAVENOUS at 10:05

## 2017-01-01 RX ADMIN — METOPROLOL TARTRATE 25 MG: 25 TABLET ORAL at 08:05

## 2017-01-01 RX ADMIN — STANDARDIZED SENNA CONCENTRATE AND DOCUSATE SODIUM 1 TABLET: 8.6; 5 TABLET, FILM COATED ORAL at 08:05

## 2017-01-01 RX ADMIN — IBUPROFEN 200 MG: 200 TABLET, FILM COATED ORAL at 12:04

## 2017-01-01 RX ADMIN — STANDARDIZED SENNA CONCENTRATE AND DOCUSATE SODIUM 1 TABLET: 8.6; 5 TABLET, FILM COATED ORAL at 09:05

## 2017-01-01 RX ADMIN — ENOXAPARIN SODIUM 40 MG: 100 INJECTION SUBCUTANEOUS at 04:04

## 2017-01-01 RX ADMIN — HEPARIN SODIUM 5000 UNITS: 5000 INJECTION, SOLUTION INTRAVENOUS; SUBCUTANEOUS at 08:05

## 2017-01-01 RX ADMIN — SODIUM CHLORIDE, PRESERVATIVE FREE 3 ML: 5 INJECTION INTRAVENOUS at 03:05

## 2017-01-01 RX ADMIN — VANCOMYCIN HYDROCHLORIDE 1500 MG: 1 INJECTION, POWDER, LYOPHILIZED, FOR SOLUTION INTRAVENOUS at 05:05

## 2017-01-01 RX ADMIN — IPRATROPIUM BROMIDE AND ALBUTEROL SULFATE 3 ML: .5; 3 SOLUTION RESPIRATORY (INHALATION) at 11:05

## 2017-01-01 RX ADMIN — SODIUM CHLORIDE 500 ML: 0.9 INJECTION, SOLUTION INTRAVENOUS at 07:04

## 2017-01-01 RX ADMIN — LEVALBUTEROL 1.25 MG: 1.25 SOLUTION, CONCENTRATE RESPIRATORY (INHALATION) at 07:04

## 2017-01-01 RX ADMIN — METHYLPREDNISOLONE SODIUM SUCCINATE 80 MG: 125 INJECTION, POWDER, FOR SOLUTION INTRAMUSCULAR; INTRAVENOUS at 08:04

## 2017-01-01 RX ADMIN — ONDANSETRON 4 MG: 2 INJECTION INTRAMUSCULAR; INTRAVENOUS at 08:05

## 2017-01-01 RX ADMIN — LOSARTAN POTASSIUM 25 MG: 25 TABLET ORAL at 08:05

## 2017-01-01 RX ADMIN — ENOXAPARIN SODIUM 40 MG: 100 INJECTION SUBCUTANEOUS at 08:04

## 2017-01-01 RX ADMIN — IPRATROPIUM BROMIDE AND ALBUTEROL SULFATE 3 ML: .5; 3 SOLUTION RESPIRATORY (INHALATION) at 03:05

## 2017-01-01 RX ADMIN — VANCOMYCIN HYDROCHLORIDE 1500 MG: 1 INJECTION, POWDER, LYOPHILIZED, FOR SOLUTION INTRAVENOUS at 10:04

## 2017-01-01 RX ADMIN — SODIUM CHLORIDE, PRESERVATIVE FREE 3 ML: 5 INJECTION INTRAVENOUS at 09:05

## 2017-01-01 RX ADMIN — LEVALBUTEROL 1.25 MG: 1.25 SOLUTION, CONCENTRATE RESPIRATORY (INHALATION) at 01:04

## 2017-01-01 RX ADMIN — SODIUM CHLORIDE, PRESERVATIVE FREE 3 ML: 5 INJECTION INTRAVENOUS at 10:05

## 2017-01-01 RX ADMIN — IPRATROPIUM BROMIDE AND ALBUTEROL SULFATE 3 ML: .5; 3 SOLUTION RESPIRATORY (INHALATION) at 12:04

## 2017-01-01 RX ADMIN — SODIUM CHLORIDE, PRESERVATIVE FREE 3 ML: 5 INJECTION INTRAVENOUS at 01:05

## 2017-01-01 RX ADMIN — LEVALBUTEROL 1.25 MG: 1.25 SOLUTION, CONCENTRATE RESPIRATORY (INHALATION) at 08:04

## 2017-01-01 RX ADMIN — IPRATROPIUM BROMIDE AND ALBUTEROL SULFATE 3 ML: .5; 3 SOLUTION RESPIRATORY (INHALATION) at 12:05

## 2017-01-01 RX ADMIN — FUROSEMIDE 60 MG: 10 INJECTION, SOLUTION INTRAMUSCULAR; INTRAVENOUS at 08:05

## 2017-01-01 RX ADMIN — SODIUM CHLORIDE, PRESERVATIVE FREE 3 ML: 5 INJECTION INTRAVENOUS at 05:05

## 2017-01-01 RX ADMIN — IPRATROPIUM BROMIDE AND ALBUTEROL SULFATE 3 ML: .5; 3 SOLUTION RESPIRATORY (INHALATION) at 07:04

## 2017-01-01 RX ADMIN — SODIUM CHLORIDE, PRESERVATIVE FREE 3 ML: 5 INJECTION INTRAVENOUS at 08:05

## 2017-01-01 RX ADMIN — FUROSEMIDE 40 MG: 10 INJECTION, SOLUTION INTRAMUSCULAR; INTRAVENOUS at 05:05

## 2017-01-01 RX ADMIN — FUROSEMIDE 60 MG: 10 INJECTION, SOLUTION INTRAMUSCULAR; INTRAVENOUS at 05:05

## 2017-01-01 RX ADMIN — PIPERACILLIN SODIUM AND TAZOBACTAM SODIUM 4.5 G: 4; .5 INJECTION, POWDER, LYOPHILIZED, FOR SOLUTION INTRAVENOUS at 05:05

## 2017-01-01 RX ADMIN — METOPROLOL TARTRATE 25 MG: 25 TABLET ORAL at 10:05

## 2017-01-01 RX ADMIN — IPRATROPIUM BROMIDE AND ALBUTEROL SULFATE 3 ML: .5; 3 SOLUTION RESPIRATORY (INHALATION) at 08:05

## 2017-01-01 RX ADMIN — METOPROLOL TARTRATE 25 MG: 25 TABLET ORAL at 09:04

## 2017-01-01 RX ADMIN — BENZONATATE 100 MG: 100 CAPSULE ORAL at 04:05

## 2017-01-01 RX ADMIN — AMLODIPINE BESYLATE 10 MG: 10 TABLET ORAL at 08:04

## 2017-01-01 RX ADMIN — IBUPROFEN 200 MG: 200 TABLET, FILM COATED ORAL at 09:04

## 2017-01-01 RX ADMIN — CEFTRIAXONE 1 G: 1 INJECTION, SOLUTION INTRAVENOUS at 08:04

## 2017-01-01 RX ADMIN — AZITHROMYCIN MONOHYDRATE 500 MG: 500 INJECTION, POWDER, LYOPHILIZED, FOR SOLUTION INTRAVENOUS at 06:05

## 2017-01-01 RX ADMIN — AMLODIPINE BESYLATE 10 MG: 10 TABLET ORAL at 09:04

## 2017-01-01 RX ADMIN — IPRATROPIUM BROMIDE AND ALBUTEROL SULFATE 3 ML: .5; 3 SOLUTION RESPIRATORY (INHALATION) at 07:05

## 2017-01-01 RX ADMIN — IMIPENEM AND CILASTATIN SODIUM 500 MG: 500; 500 INJECTION, POWDER, FOR SOLUTION INTRAVENOUS at 03:05

## 2017-01-01 RX ADMIN — IPRATROPIUM BROMIDE AND ALBUTEROL SULFATE 3 ML: .5; 3 SOLUTION RESPIRATORY (INHALATION) at 04:05

## 2017-01-01 RX ADMIN — IMIPENEM AND CILASTATIN SODIUM 500 MG: 500; 500 INJECTION, POWDER, FOR SOLUTION INTRAVENOUS at 08:04

## 2017-01-01 RX ADMIN — MONTELUKAST SODIUM 10 MG: 10 TABLET, FILM COATED ORAL at 12:04

## 2017-01-01 RX ADMIN — PIPERACILLIN SODIUM AND TAZOBACTAM SODIUM 4.5 G: 4; .5 INJECTION, POWDER, LYOPHILIZED, FOR SOLUTION INTRAVENOUS at 06:05

## 2017-01-01 RX ADMIN — METHYLPREDNISOLONE SODIUM SUCCINATE 125 MG: 125 INJECTION, POWDER, FOR SOLUTION INTRAMUSCULAR; INTRAVENOUS at 10:04

## 2017-01-01 RX ADMIN — METHYLPREDNISOLONE SODIUM SUCCINATE 80 MG: 125 INJECTION, POWDER, FOR SOLUTION INTRAMUSCULAR; INTRAVENOUS at 10:05

## 2017-01-01 RX ADMIN — POTASSIUM CHLORIDE 40 MEQ: 20 TABLET, EXTENDED RELEASE ORAL at 08:05

## 2017-01-01 RX ADMIN — AMLODIPINE BESYLATE 10 MG: 5 TABLET ORAL at 08:05

## 2017-01-01 RX ADMIN — VANCOMYCIN HYDROCHLORIDE 1000 MG: 1 INJECTION, POWDER, LYOPHILIZED, FOR SOLUTION INTRAVENOUS at 08:05

## 2017-01-01 RX ADMIN — IMIPENEM AND CILASTATIN SODIUM 500 MG: 500; 500 INJECTION, POWDER, FOR SOLUTION INTRAVENOUS at 09:04

## 2017-01-01 RX ADMIN — SODIUM CHLORIDE, PRESERVATIVE FREE 3 ML: 5 INJECTION INTRAVENOUS at 06:05

## 2017-01-01 RX ADMIN — VANCOMYCIN HYDROCHLORIDE 1250 MG: 1 INJECTION, POWDER, LYOPHILIZED, FOR SOLUTION INTRAVENOUS at 08:05

## 2017-01-01 RX ADMIN — IOHEXOL 100 ML: 350 INJECTION, SOLUTION INTRAVENOUS at 07:04

## 2017-01-01 RX ADMIN — SODIUM CHLORIDE: 0.9 INJECTION, SOLUTION INTRAVENOUS at 09:04

## 2017-01-01 RX ADMIN — PREDNISONE 40 MG: 20 TABLET ORAL at 08:05

## 2017-01-01 RX ADMIN — POLYETHYLENE GLYCOL 3350 17 G: 17 POWDER, FOR SOLUTION ORAL at 09:05

## 2017-01-01 RX ADMIN — SODIUM CHLORIDE: 0.9 INJECTION, SOLUTION INTRAVENOUS at 08:04

## 2017-01-01 RX ADMIN — PIPERACILLIN SODIUM AND TAZOBACTAM SODIUM 4.5 G: 4; .5 INJECTION, POWDER, LYOPHILIZED, FOR SOLUTION INTRAVENOUS at 03:05

## 2017-01-01 RX ADMIN — FUROSEMIDE 20 MG: 10 INJECTION, SOLUTION INTRAMUSCULAR; INTRAVENOUS at 06:05

## 2017-01-01 RX ADMIN — POLYETHYLENE GLYCOL 3350 17 G: 17 POWDER, FOR SOLUTION ORAL at 03:05

## 2017-01-01 RX ADMIN — AZITHROMYCIN MONOHYDRATE 500 MG: 500 INJECTION, POWDER, LYOPHILIZED, FOR SOLUTION INTRAVENOUS at 04:05

## 2017-01-01 RX ADMIN — METOPROLOL TARTRATE 25 MG: 25 TABLET ORAL at 08:04

## 2017-01-01 RX ADMIN — IMIPENEM AND CILASTATIN SODIUM 500 MG: 500; 500 INJECTION, POWDER, FOR SOLUTION INTRAVENOUS at 02:04

## 2017-01-01 RX ADMIN — AMLODIPINE BESYLATE 10 MG: 10 TABLET ORAL at 10:05

## 2017-01-01 RX ADMIN — ONDANSETRON 4 MG: 2 INJECTION INTRAMUSCULAR; INTRAVENOUS at 01:05

## 2017-01-01 RX ADMIN — PIPERACILLIN SODIUM AND TAZOBACTAM SODIUM 4.5 G: 4; .5 INJECTION, POWDER, LYOPHILIZED, FOR SOLUTION INTRAVENOUS at 07:05

## 2017-01-01 RX ADMIN — LEVALBUTEROL 1.25 MG: 1.25 SOLUTION, CONCENTRATE RESPIRATORY (INHALATION) at 02:04

## 2017-01-01 RX ADMIN — CEFTRIAXONE 1 G: 1 INJECTION, SOLUTION INTRAVENOUS at 09:04

## 2017-01-01 RX ADMIN — FUROSEMIDE 40 MG: 10 INJECTION, SOLUTION INTRAMUSCULAR; INTRAVENOUS at 07:04

## 2017-01-01 RX ADMIN — METOPROLOL TARTRATE 25 MG: 25 TABLET ORAL at 09:05

## 2017-01-01 RX ADMIN — IBUPROFEN 200 MG: 200 TABLET, FILM COATED ORAL at 06:05

## 2017-01-01 RX ADMIN — FUROSEMIDE 40 MG: 10 INJECTION, SOLUTION INTRAMUSCULAR; INTRAVENOUS at 03:05

## 2017-01-01 RX ADMIN — VANCOMYCIN HYDROCHLORIDE 1250 MG: 750 INJECTION, POWDER, LYOPHILIZED, FOR SOLUTION INTRAVENOUS at 12:05

## 2017-01-01 RX ADMIN — POLYETHYLENE GLYCOL 3350 17 G: 17 POWDER, FOR SOLUTION ORAL at 08:05

## 2017-01-01 RX ADMIN — ENOXAPARIN SODIUM 40 MG: 100 INJECTION SUBCUTANEOUS at 05:04

## 2017-01-01 RX ADMIN — LOSARTAN POTASSIUM 100 MG: 50 TABLET, FILM COATED ORAL at 10:05

## 2017-01-01 RX ADMIN — PANTOPRAZOLE SODIUM 40 MG: 40 INJECTION, POWDER, FOR SOLUTION INTRAVENOUS at 12:05

## 2017-01-01 RX ADMIN — AZITHROMYCIN MONOHYDRATE 500 MG: 500 INJECTION, POWDER, LYOPHILIZED, FOR SOLUTION INTRAVENOUS at 10:04

## 2017-01-01 RX ADMIN — VANCOMYCIN HYDROCHLORIDE 1250 MG: 750 INJECTION, POWDER, LYOPHILIZED, FOR SOLUTION INTRAVENOUS at 01:05

## 2017-01-01 RX ADMIN — FUROSEMIDE 40 MG: 10 INJECTION, SOLUTION INTRAMUSCULAR; INTRAVENOUS at 09:05

## 2017-01-01 RX ADMIN — AMLODIPINE BESYLATE 10 MG: 5 TABLET ORAL at 09:05

## 2017-01-01 RX ADMIN — STANDARDIZED SENNA CONCENTRATE AND DOCUSATE SODIUM 1 TABLET: 8.6; 5 TABLET, FILM COATED ORAL at 03:05

## 2017-01-01 RX ADMIN — IPRATROPIUM BROMIDE AND ALBUTEROL SULFATE 3 ML: .5; 3 SOLUTION RESPIRATORY (INHALATION) at 09:05

## 2017-01-01 RX ADMIN — PREDNISONE 60 MG: 20 TABLET ORAL at 09:05

## 2017-01-01 RX ADMIN — HEPARIN SODIUM 5000 UNITS: 5000 INJECTION, SOLUTION INTRAVENOUS; SUBCUTANEOUS at 10:05

## 2017-01-01 RX ADMIN — POTASSIUM CHLORIDE 40 MEQ: 20 SOLUTION ORAL at 06:05

## 2017-01-01 RX ADMIN — POTASSIUM CHLORIDE 40 MEQ: 20 TABLET, EXTENDED RELEASE ORAL at 11:05

## 2017-01-01 RX ADMIN — PIPERACILLIN SODIUM AND TAZOBACTAM SODIUM 4.5 G: 4; .5 INJECTION, POWDER, LYOPHILIZED, FOR SOLUTION INTRAVENOUS at 12:05

## 2017-01-01 RX ADMIN — FUROSEMIDE 20 MG: 10 INJECTION, SOLUTION INTRAMUSCULAR; INTRAVENOUS at 09:04

## 2017-01-01 RX ADMIN — FUROSEMIDE 60 MG: 10 INJECTION, SOLUTION INTRAMUSCULAR; INTRAVENOUS at 01:05

## 2017-01-01 RX ADMIN — FUROSEMIDE 40 MG: 10 INJECTION, SOLUTION INTRAMUSCULAR; INTRAVENOUS at 08:05

## 2017-01-01 RX ADMIN — IMIPENEM AND CILASTATIN SODIUM 500 MG: 500; 500 INJECTION, POWDER, FOR SOLUTION INTRAVENOUS at 04:04

## 2017-01-01 RX ADMIN — SODIUM CHLORIDE: 0.9 INJECTION, SOLUTION INTRAVENOUS at 02:04

## 2017-01-01 RX ADMIN — LOSARTAN POTASSIUM 25 MG: 25 TABLET ORAL at 10:05

## 2017-01-01 RX ADMIN — PREDNISONE 40 MG: 20 TABLET ORAL at 04:05

## 2017-01-01 RX ADMIN — IPRATROPIUM BROMIDE AND ALBUTEROL SULFATE 3 ML: .5; 3 SOLUTION RESPIRATORY (INHALATION) at 01:05

## 2017-01-01 RX ADMIN — PIPERACILLIN SODIUM AND TAZOBACTAM SODIUM 4.5 G: 4; .5 INJECTION, POWDER, LYOPHILIZED, FOR SOLUTION INTRAVENOUS at 04:05

## 2017-01-01 RX ADMIN — AZITHROMYCIN MONOHYDRATE 500 MG: 500 INJECTION, POWDER, LYOPHILIZED, FOR SOLUTION INTRAVENOUS at 07:04

## 2017-01-01 RX ADMIN — PREDNISONE 60 MG: 20 TABLET ORAL at 08:05

## 2017-01-01 RX ADMIN — LOSARTAN POTASSIUM 100 MG: 50 TABLET, FILM COATED ORAL at 09:04

## 2017-01-01 RX ADMIN — PANTOPRAZOLE SODIUM 40 MG: 40 INJECTION, POWDER, FOR SOLUTION INTRAVENOUS at 08:05

## 2017-01-01 RX ADMIN — LORAZEPAM 1 MG: 1 TABLET ORAL at 11:04

## 2017-01-01 RX ADMIN — IMIPENEM AND CILASTATIN SODIUM 500 MG: 500; 500 INJECTION, POWDER, FOR SOLUTION INTRAVENOUS at 03:04

## 2017-01-01 RX ADMIN — FUROSEMIDE 60 MG: 10 INJECTION, SOLUTION INTRAMUSCULAR; INTRAVENOUS at 06:05

## 2017-01-01 RX ADMIN — MONTELUKAST SODIUM 10 MG: 10 TABLET, FILM COATED ORAL at 10:05

## 2017-01-01 RX ADMIN — FUROSEMIDE 40 MG: 10 INJECTION, SOLUTION INTRAMUSCULAR; INTRAVENOUS at 02:04

## 2017-01-01 RX ADMIN — SODIUM CHLORIDE, PRESERVATIVE FREE 3 ML: 5 INJECTION INTRAVENOUS at 02:05

## 2017-01-01 RX ADMIN — ALUMINUM HYDROXIDE, MAGNESIUM HYDROXIDE, AND SIMETHICONE 30 ML: 200; 200; 20 SUSPENSION ORAL at 10:05

## 2017-02-14 NOTE — TELEPHONE ENCOUNTER
Spoke to patient and he told me to disregard call. He will schedule an appointment with a local podiatrist outside of [facility] and no referral is needed at this time.

## 2017-02-14 NOTE — TELEPHONE ENCOUNTER
----- Message from Zana Clay sent at 2017  3:56 PM CST -----  Contact: SELF  Sandeep Wolfe  MRN: [id number]  : 1930  PCP: Juan José Berumen  Home Phone      [phone]  Work Phone      Not on file.  Mobile          [phone]      MESSAGE: PT NEEDS RECOMMENDATION AND/OR REFERRAL TO A PODIATRIST. PLEASE CALL     PHONE: [phone]

## 2017-04-11 NOTE — PROGRESS NOTES
Subjective:       Patient ID: Sandeep Wolfe is a 86 y.o. male.    Chief Complaint: Numbness (right leg) and Toe Pain (right foot)    Pt is a 86 y.o. male who presents for evaluation and management of   Encounter Diagnoses   Name Primary?    Essential hypertension Yes    Lumbar stenosis     Screening for prostate cancer    .  Doing well on current meds. Denies any side effects. Prevention is up to date.  Review of Systems   Constitutional: Negative for activity change and unexpected weight change.   HENT: Negative for rhinorrhea and trouble swallowing.    Eyes: Negative for discharge and visual disturbance.   Respiratory: Negative for chest tightness and wheezing.    Cardiovascular: Negative for chest pain and palpitations.   Gastrointestinal: Negative for blood in stool, constipation, diarrhea and vomiting.   Endocrine: Negative for polydipsia and polyuria.   Genitourinary: Negative for difficulty urinating, hematuria and urgency.   Musculoskeletal: Positive for arthralgias. Negative for joint swelling.   Neurological: Negative for weakness and headaches.   Psychiatric/Behavioral: Negative for confusion and dysphoric mood.       Objective:      Physical Exam   Constitutional: He is oriented to person, place, and time. He appears well-developed and well-nourished.   HENT:   Head: Normocephalic and atraumatic.   Right Ear: External ear normal.   Left Ear: External ear normal.   Nose: Nose normal.   Mouth/Throat: Oropharynx is clear and moist.   Eyes: Conjunctivae and EOM are normal. Pupils are equal, round, and reactive to light. Right eye exhibits no discharge. Left eye exhibits no discharge. No scleral icterus.   Neck: Normal range of motion. Neck supple. No JVD present. No tracheal deviation present. No thyromegaly present.   Cardiovascular: Normal rate, regular rhythm, normal heart sounds and intact distal pulses.    No murmur heard.  Pulmonary/Chest: Effort normal and breath sounds normal. No respiratory  distress. He has no wheezes. He has no rales. He exhibits no tenderness.   Abdominal: Soft. Bowel sounds are normal. He exhibits no distension and no mass. There is no tenderness. There is no rebound and no guarding.   Musculoskeletal: Normal range of motion.   Lymphadenopathy:     He has no cervical adenopathy.   Neurological: He is alert and oriented to person, place, and time. He displays normal reflexes. No cranial nerve deficit. He exhibits normal muscle tone. Coordination normal.   Depressed DTR's BLE  Neg SLR    Skin: Skin is warm and dry.   Psychiatric: He has a normal mood and affect. His behavior is normal. Judgment and thought content normal.       Assessment:       1. Essential hypertension    2. Lumbar stenosis    3. Screening for prostate cancer        Plan:   Sandeep was seen today for numbness and toe pain.    Diagnoses and all orders for this visit:    Essential hypertension  -     Lipid panel; Future  -     Comprehensive metabolic panel; Future  -     TSH; Future    Lumbar stenosis  -     naproxen (NAPROSYN) 500 MG tablet; Take 1 tablet (500 mg total) by mouth 2 (two) times daily as needed.    Screening for prostate cancer  -     PSA, Screening; Future    RTC yearly prn     No Follow-up on file.

## 2017-04-11 NOTE — MR AVS SNAPSHOT
[address] [address]  Phone: [phone]  Fax: [phone]                  Sandeep Wolfe   2017 2:45 PM   Office Visit    Description:  Male : 1930   Provider:  Juan José Berumen MD   Department:  [facility]           Reason for Visit     Numbness     Toe Pain           Diagnoses this Visit        Comments    Essential hypertension    -  Primary     Lumbar stenosis         Screening for prostate cancer                To Do List           Future Appointments        Provider Department Dept Phone    2017 9:15 AM RON, [facility] [phone]      Goals (5 Years of Data)     None      Follow-Up and Disposition     Return in about 1 year (around 2018).       These Medications        Disp Refills Start End    naproxen (NAPROSYN) 500 MG tablet 60 tablet 2 2017     Take 1 tablet (500 mg total) by mouth 2 (two) times daily as needed. - Oral    Pharmacy: [facility] [address] Ph #: [phone]         [facility] On Call     [facility] On Call Nurse Care Line -  Assistance  Unless otherwise directed by your provider, please contact [facility], our nurse care line that is available for  assistance.     Registered nurses in the [facility] provide: appointment scheduling, clinical advisement, health education, and other advisory services.  Call: [phone] (toll free)               Medications           Message regarding Medications     Verify the changes and/or additions to your medication regime listed below are the same as discussed with your clinician today.  If any of these changes or additions are incorrect, please notify your healthcare provider.        START taking these NEW medications        Refills    naproxen (NAPROSYN) 500 MG tablet 2    Sig: Take 1 tablet (500 mg total) by mouth 2 (two) times daily as needed.    Class:  Normal    Route: Oral      STOP taking these medications     azithromycin (ZITHROMAX Z-KIKE) 250 MG tablet Take 2 tabs by mouth on first day then 1 tab on day 2-5    tamsulosin (FLOMAX) 0.4 mg Cp24 Take 1 capsule (0.4 mg total) by mouth once daily.           Verify that the below list of medications is an accurate representation of the medications you are currently taking.  If none reported, the list may be blank. If incorrect, please contact your healthcare provider. Carry this list with you in case of emergency.           Current Medications     amlodipine (NORVASC) 10 MG tablet TAKE 1 TABLET DAILY    calcium-vitamin D 500-125 mg-unit tablet Take 1 tablet by mouth 2 (two) times daily.      fish oil-omega-3 fatty acids 300-1,000 mg capsule Take 2 g by mouth once daily.      glucosamine-chondroitin 500-400 mg tablet Take 1 tablet by mouth 3 (three) times daily.      losartan (COZAAR) 100 MG tablet TAKE 1 TABLET DAILY    metoprolol tartrate (LOPRESSOR) 25 MG tablet Take 1 tablet (25 mg total) by mouth 2 (two) times daily.    multivitamin with minerals tablet Take 1 tablet by mouth once daily.      naproxen (NAPROSYN) 500 MG tablet Take 1 tablet (500 mg total) by mouth 2 (two) times daily as needed.           Clinical Reference Information           Your Vitals Were     BP Pulse Resp Height Weight BMI    126/70 (BP Location: Left arm, Patient Position: Sitting, BP Method: Manual) 68 16 6' (1.829 m) 84.1 kg (185 lb 6.5 oz) 25.15 kg/m2      Blood Pressure          Most Recent Value    BP  126/70      Allergies as of 4/11/2017     Livalo [Pitavastatin]    Tribenzor [Olmesartan-amlodipin-hcthiazid]      Immunizations Administered on Date of Encounter - 4/11/2017     None      Orders Placed During Today's Visit     Future Labs/Procedures Expected by Expires    Comprehensive metabolic panel  4/12/2017 4/11/2018    Lipid panel  4/12/2017 4/11/2018    PSA, Screening  4/12/2017 4/11/2018    TSH  4/12/2017 4/11/2018       Language Assistance Services     ATTENTION: Language assistance services are available, free of charge. Please call [phone].      ATENCIÓN: Si habla jovi, tiene a orlando disposición servicios gratuitos de asistencia lingüística. Llame al [phone].     CHÚ Ý: N?u b?n nói Ti?ng Vi?t, có các d?ch v? h? tr? ngôn ng? mi?n phí dành cho b?n. G?i s? [phone].         [facility] complies with applicable Federal civil rights laws and does not discriminate on the basis of race, color, national origin, age, disability, or sex.

## 2017-04-13 NOTE — PROGRESS NOTES
His PSA is elevated which means I need to do a prostate exam on him. I need him to come in for that. Make appt please. Also his cholesterol is elevated, I know he doesn't like the cholesterol medicines, had problems with muscle cramps in the past. We can talk about options at appt thanks

## 2017-04-25 NOTE — TELEPHONE ENCOUNTER
He's much better this morning, Tuesday , 4/25/17, please cancel request.  He will attend the previously scheduled visit, for May 1, 2017.

## 2017-04-26 PROBLEM — E87.1 HYPONATREMIA: Status: ACTIVE | Noted: 2017-01-01

## 2017-04-26 NOTE — TELEPHONE ENCOUNTER
----- Message from Kirby Leslie sent at 2017  3:22 PM CDT -----  Contact: Wife - Reid Wolfe  MRN: [id number]  : 1930  PCP: Juan José Berumen  Home Phone      [phone]  Work Phone      Not on file.  Mobile          [phone]      MESSAGE: has been ill for over a week -- increasing weakness -- please advise -- called back - she is bringing him to the ER    Call [phone]    PCP: Ata

## 2017-04-26 NOTE — ED TRIAGE NOTES
Patient comes in today complaining of URI that has lasted 10 days.  He went to urgent care and was given a Z-silvia.  He has 1 more pill to take.  He states that now he has abdominal bloating.  He had a small BM yesterday.

## 2017-04-26 NOTE — ED PROVIDER NOTES
Encounter Date: 4/26/2017       History     Chief Complaint   Patient presents with    URI     cold symptoms for 10 days    Bloated     for about 3 days .  states last BM yesterday but it was small     Review of patient's allergies indicates:   Allergen Reactions    Livalo [pitavastatin] Other (See Comments)     Muscle cramps    Tribenzor [olmesartan-amlodipin-hcthiazid] Other (See Comments)     Muscle cramps     HPI Comments: Seen for URI yesterday at local Urgent care.  Wife states increase generalizied weakness and ''abdominal bloating''    Patient is a 86 y.o. male presenting with the following complaint: general illness. The history is provided by the patient and the spouse.   General Illness    The current episode started today. The problem occurs rarely. The problem has been unchanged. The pain is at a severity of 2/10. The symptoms are relieved by rest. The symptoms are aggravated by activity. Pertinent negatives include no fever, no decreased vision, no double vision, no eye itching, no photophobia, no abdominal pain, no constipation, no diarrhea, no nausea, no vomiting, no congestion, no ear discharge, no ear pain, no headaches, no hearing loss, no mouth sores, no rhinorrhea, no sore throat, no stridor, no swollen glands, no muscle aches, no neck pain, no neck stiffness, no cough, no shortness of breath, no URI, no wheezing, no rash, no discharge, no pain and no eye redness. Services received include medications given. Recently, medical care has been given at another facility.     Past Medical History:   Diagnosis Date    BPH (benign prostatic hyperplasia)     Cataract     Hyperlipidemia     Hypertension     Kidney stone      Past Surgical History:   Procedure Laterality Date    APPENDECTOMY      LASER OF PROSTATE W/ GREEN LIGHT PVP      TONSILLECTOMY       Family History   Problem Relation Age of Onset    Stroke Mother     Cancer Father      Social History   Substance Use Topics    Smoking  status: Former Smoker     Types: Cigarettes     Quit date: 1/1/1980    Smokeless tobacco: Never Used    Alcohol use 8.4 oz/week     14 Shots of liquor per week      Comment: couple of shots every night     Review of Systems   Constitutional: Negative for fever.   HENT: Negative for congestion, ear discharge, ear pain, hearing loss, mouth sores, rhinorrhea and sore throat.    Eyes: Negative for double vision, photophobia, pain, discharge, redness and itching.   Respiratory: Negative for cough, shortness of breath, wheezing and stridor.    Cardiovascular: Positive for leg swelling. Negative for chest pain and palpitations.   Gastrointestinal: Positive for abdominal distention. Negative for abdominal pain, constipation, diarrhea, nausea and vomiting.   Genitourinary: Negative for enuresis, flank pain and frequency.   Musculoskeletal: Negative for back pain, neck pain and neck stiffness.   Skin: Negative for rash and wound.   Neurological: Positive for weakness. Negative for dizziness, tremors, syncope and headaches.       Physical Exam   Initial Vitals   BP Pulse Resp Temp SpO2   04/26/17 1621 04/26/17 1621 -- 04/26/17 1621 --   127/64 72  97.8 °F (36.6 °C)      Physical Exam    Nursing note and vitals reviewed.  Constitutional: He appears well-developed and well-nourished. He is not diaphoretic. No distress.   HENT:   Head: Normocephalic and atraumatic.   Mouth/Throat: No oropharyngeal exudate.   Eyes: Conjunctivae and EOM are normal. Pupils are equal, round, and reactive to light. Right eye exhibits no discharge. Left eye exhibits no discharge. No scleral icterus.   Neck: Normal range of motion. Neck supple. No JVD present.   Pulmonary/Chest: Breath sounds normal. No stridor. No respiratory distress. He has no wheezes. He has no rhonchi. He has no rales.   Abdominal: He exhibits distension. There is no tenderness. There is no rebound and no guarding.       Musculoskeletal: Normal range of motion. He exhibits edema.  He exhibits no tenderness.        Legs:  Neurological: He is alert and oriented to person, place, and time. He has normal strength and normal reflexes.   Skin: No rash noted.         ED Course   Procedures  Labs Reviewed   COMPREHENSIVE METABOLIC PANEL   CBC W/ AUTO DIFFERENTIAL   TROPONIN I   CK   CK-MB   B-TYPE NATRIURETIC PEPTIDE   PROTIME-INR   APTT                               ED Course     Clinical Impression:   The primary encounter diagnosis was Hyponatremia. Diagnoses of Leg edema, Abdominal pain, and Pneumonia of right lung due to infectious organism, unspecified part of lung were also pertinent to this visit.    Disposition:   Disposition: Placed in Observation  Condition: Stable       Darrick Woody MD  04/26/17 1929

## 2017-04-27 PROBLEM — I50.9 HEART FAILURE: Status: ACTIVE | Noted: 2017-01-01

## 2017-04-27 PROBLEM — A48.1 LEGIONELLA PNEUMONIA: Status: ACTIVE | Noted: 2017-01-01

## 2017-04-27 PROBLEM — I10 ESSENTIAL HYPERTENSION: Status: ACTIVE | Noted: 2017-01-01

## 2017-04-27 PROBLEM — J18.9 PNEUMONIA OF RIGHT LUNG DUE TO INFECTIOUS ORGANISM: Status: ACTIVE | Noted: 2017-01-01

## 2017-04-27 NOTE — CONSULTS
[facility]  Cardiology  Consult Note    Patient Name: Sandeep Wolfe  MRN: [id number]  Admission Date: 4/26/2017  Hospital Length of Stay: 0 days  Code Status: Full Code   Attending Provider: Juan José Berumen MD   Consulting Provider: CHEN López  Primary Care Physician: Juan José Berumen MD  Principal Problem:Pneumonia of right lung due to infectious organism    Patient information was obtained from patient, past medical records and ER records.     Inpatient consult to Cardiology-[facility]  Consult performed by: BAILEY LOZANO  Consult ordered by: BARBARA WINN        Subjective:     Chief Complaint:  URI symptoms, generalized weakness, abdominal bloating    HPI: 86 year old w/m with HX of dyslipidemia, BPH, HTN presents to ED after being seen at urgent care for URI symptoms that have been lasting approximatley 10 days. He reports increased generalized weakness and abdominal bloating. He denies cough or significant SOB. He was diagnosed with hyponatremia and Rt sided pneumonia. He had an elevated BNP. [facility] asked to evaluate. He denies chest pain, SOB, or palpitations at this time. He has never been evaluated by cardiology.     Past Medical History:   Diagnosis Date    BPH (benign prostatic hyperplasia)     Cataract     Hyperlipidemia     Hypertension     Kidney stone        Past Surgical History:   Procedure Laterality Date    APPENDECTOMY      LASER OF PROSTATE W/ GREEN LIGHT PVP      TONSILLECTOMY         Review of patient's allergies indicates:   Allergen Reactions    Livalo [pitavastatin] Other (See Comments)     Muscle cramps    Tribenzor [olmesartan-amlodipin-hcthiazid] Other (See Comments)     Muscle cramps       No current facility-administered medications on file prior to encounter.      Current Outpatient Prescriptions on File Prior to Encounter   Medication Sig    amlodipine (NORVASC) 10 MG tablet TAKE 1 TABLET DAILY    calcium-vitamin D 500-125 mg-unit tablet  Take 1 tablet by mouth 2 (two) times daily.      fish oil-omega-3 fatty acids 300-1,000 mg capsule Take 2 g by mouth once daily.      glucosamine-chondroitin 500-400 mg tablet Take 1 tablet by mouth 3 (three) times daily.      losartan (COZAAR) 100 MG tablet TAKE 1 TABLET DAILY    metoprolol tartrate (LOPRESSOR) 25 MG tablet Take 1 tablet (25 mg total) by mouth 2 (two) times daily.    multivitamin with minerals tablet Take 1 tablet by mouth once daily.      naproxen (NAPROSYN) 500 MG tablet Take 1 tablet (500 mg total) by mouth 2 (two) times daily as needed.     Family History     Problem Relation (Age of Onset)    Cancer Father    Stroke Mother        Social History Main Topics    Smoking status: Former Smoker     Types: Cigarettes     Quit date: 1/1/1980    Smokeless tobacco: Never Used    Alcohol use 8.4 oz/week     14 Shots of liquor per week      Comment: couple of shots every night    Drug use: No    Sexual activity: Not on file     ROS   Constitutional: generalized weakness  Eyes: Negative    Respiratory: Negative    Cardiovascular: Negative   Gastrointestinal: abdominal bloating    Genitourinary: Negative   Musculoskeletal: Negative   Skin: Negative .   Neurological: Negative   Objective:     Vital Signs (Most Recent):  Temp: 97 °F (36.1 °C) (04/27/17 0737)  Pulse: 67 (04/27/17 0754)  Resp: 16 (04/27/17 0754)  BP: (!) 156/74 (04/27/17 0737)  SpO2: (!) 92 % (04/27/17 0754) Vital Signs (24h Range):  Temp:  [96.5 °F (35.8 °C)-97.8 °F (36.6 °C)] 97 °F (36.1 °C)  Pulse:  [55-78] 67  Resp:  [16-24] 16  SpO2:  [82 %-95 %] 92 %  BP: (127-156)/(64-74) 156/74     Weight: 86.2 kg (190 lb)  Body mass index is 25.77 kg/(m^2).    SpO2: (!) 92 %  O2 Device (Oxygen Therapy): nasal cannula      Intake/Output Summary (Last 24 hours) at 04/27/17 0848  Last data filed at 04/27/17 0600   Gross per 24 hour   Intake          1083.33 ml   Output             2200 ml   Net         -1116.67 ml       Lines/Drains/Airways      Peripheral Intravenous Line                 Peripheral IV - Single Lumen 04/26/17 2113 Left Forearm less than 1 day                Physical Exam  General appearance: alert, appears stated age and cooperative  Head: Normocephalic, without obvious abnormality, atraumatic  Eyes: conjunctivae/corneas clear. PERRL  Neck: no carotid bruit, no JVD and supple, symmetrical, trachea midline  Lungs: clear to auscultation bilaterally, normal respiratory effort  Chest Wall: no tenderness  Heart: regular rate and rhythm, S1, S2 normal, no murmur, click, rub or gallop  Abdomen: soft, non-tender; bowel sounds normal; no masses,  no organomegaly  Extremities: Extremities normal, atraumatic, no cyanosis, clubbing, or edema  Pulses: Dorsalis Pedis R: 2+ (normal)/L: 2+ (normal)  Skin: Skin color, texture, turgor normal. No rashes or lesions  Neurologic: generalized weakness  Alert and oriented X 3  Significant Labs:   Blood Culture:   Recent Labs  Lab 04/26/17 1914   LABBLOO No Growth to date  No Growth to date   , BMP:   Recent Labs  Lab 04/26/17 1821 04/27/17  0550   * 83   * 122*   K 4.6 3.7   CL 83* 88*   CO2 24 22*   BUN 16 13   CREATININE 0.9 0.9   CALCIUM 8.3* 8.2*   , CMP   Recent Labs  Lab 04/26/17 1821 04/27/17  0550   * 122*   K 4.6 3.7   CL 83* 88*   CO2 24 22*   * 83   BUN 16 13   CREATININE 0.9 0.9   CALCIUM 8.3* 8.2*   PROT 5.9* 6.0   ALBUMIN 2.5* 2.5*   BILITOT 0.9 0.6   ALKPHOS 130 119   AST 39 37   ALT 54* 54*   ANIONGAP 9 12   ESTGFRAFRICA >60 >60   EGFRNONAA >60 >60   , CBC   Recent Labs  Lab 04/26/17 1821 04/27/17  0550   WBC 12.62 11.13   HGB 10.5* 10.6*   HCT 28.8* 28.8*    327   , INR   Recent Labs  Lab 04/26/17 1821   INR 1.1   , Lipid Panel No results for input(s): CHOL, HDL, LDLCALC, TRIG, CHOLHDL in the last 48 hours.,   Pathology Results  (Last 10 years)               04/27/17 0550 (A) CBC auto differential (Abnormal) Final result    Narrative:  Make sure this is done  on time! 6 am       04/26/17 1829 (A) CBC auto differential (Abnormal) Final result    05/15/14 0900  Tissue Specimen to Pathology Final result    07/06/12 0810  CBC auto differential Final result    Narrative:                                 VALUE         REFERENCE RANGE  UNITS   -------------------------------------------------------------------   WBC                              9.2              4.80-10.80  thous/uL     RBC                             4.26               4.00-6.20  mill/uL      HGB                             13.7 L             14.0-18.0  g/dL         HCT                             40.1               40.0-54.0  %            MCV                               94                   82-95  fL           MCH                             32.2 H             27.0-31.0  pg           MCHC                            34.2               32.0-36.0  g/dL         RDW                             12.3             11.50-14.50  %            PLATELET COUNT                   260                 150-350  10           MPV                             10.4              9.20-12.90  fL           SEGS                              54                   38-73  %            LYM                               31                   21-44  %            MONO                              12 H                0-7.40  %            EO                                 2                  0-4.20  %            BASO                               0                  0-1.90  %            #ROSS                               5                      10  X            #LYMPHS                            3                      10  X            #MONOS                             1 H                    10  X            #EOS                               0                      10  X            #BASO                              0                      10  X            MANUAL DIFF                       NO                                            COMMENTS:   _____________________________________________________             ___________________________________________________________             ___________________________________________________________             ___________________________________________________________         , Troponin   Recent Labs  Lab 04/26/17  1821 04/27/17  0029 04/27/17  0550   TROPONINI <0.006 <0.006 <0.006    and All pertinent lab results from the last 24 hours have been reviewed.    Significant Imaging: CT scan: CT ABDOMEN PELVIS WITH CONTRAST: No results found for this visit on 04/26/17. and CT ABDOMEN PELVIS WITHOUT CONTRAST: No results found for this visit on 04/26/17., Echocardiogram: 2D echo with color flow doppler: No results found for this or any previous visit., EKG: , Stress Test: and X-Ray: CXR: X-Ray Chest 1 View (CXR): No results found for this visit on 04/26/17.  Assessment and Plan:     Active Diagnoses:    Diagnosis Date Noted POA    PRINCIPAL PROBLEM:  Pneumonia of right lung due to infectious organism [J18.9] 04/27/2017 Yes    Heart failure [I50.9] 04/27/2017 Yes    Essential hypertension [I10] 04/27/2017 Yes    Legionella pneumonia [A48.1] 04/27/2017 Yes    Hyponatremia [E87.1] 04/26/2017 Yes      Problems Resolved During this Admission:    Diagnosis Date Noted Date Resolved POA       VTE Risk Mitigation         Ordered     Medium Risk of VTE  Once      04/26/17 2022     Place BRIAN hose  Until discontinued      04/26/17 2022        Hyponatremia- sodium level has improved from 116-122 today; probable inap SiADH  Rt lung infiltrate- consistent with pneumonia with no leukocytosis, fever, or cough, 02 saturation 92% on supplemental oxygen; consider possible PE given this presentation  No evidence of CHF at this time  Elevated BNP- patient not overtly volume overloaded on exam, denies WOOD, orthopnea, no HX of CHF  HTN- well controlled     PLAN:DVT prophylaxis  Check DDimer  Continue amlodipine,losartan,metoprolol  Review  echo    Adal Best, URIELP  Cardiology   [facility]    I attest that I have personally seen and examined this patient. I have reviewed and discussed the management in detail as outlined above.

## 2017-04-27 NOTE — SUBJECTIVE & OBJECTIVE
Past Medical History:   Diagnosis Date    BPH (benign prostatic hyperplasia)     Cataract     Hyperlipidemia     Hypertension     Kidney stone        Past Surgical History:   Procedure Laterality Date    APPENDECTOMY      LASER OF PROSTATE W/ GREEN LIGHT PVP      TONSILLECTOMY         Review of patient's allergies indicates:   Allergen Reactions    Livalo [pitavastatin] Other (See Comments)     Muscle cramps    Tribenzor [olmesartan-amlodipin-hcthiazid] Other (See Comments)     Muscle cramps       No current facility-administered medications on file prior to encounter.      Current Outpatient Prescriptions on File Prior to Encounter   Medication Sig    amlodipine (NORVASC) 10 MG tablet TAKE 1 TABLET DAILY    calcium-vitamin D 500-125 mg-unit tablet Take 1 tablet by mouth 2 (two) times daily.      fish oil-omega-3 fatty acids 300-1,000 mg capsule Take 2 g by mouth once daily.      glucosamine-chondroitin 500-400 mg tablet Take 1 tablet by mouth 3 (three) times daily.      losartan (COZAAR) 100 MG tablet TAKE 1 TABLET DAILY    metoprolol tartrate (LOPRESSOR) 25 MG tablet Take 1 tablet (25 mg total) by mouth 2 (two) times daily.    multivitamin with minerals tablet Take 1 tablet by mouth once daily.      naproxen (NAPROSYN) 500 MG tablet Take 1 tablet (500 mg total) by mouth 2 (two) times daily as needed.     Family History     Problem Relation (Age of Onset)    Cancer Father    Stroke Mother        Social History Main Topics    Smoking status: Former Smoker     Types: Cigarettes     Quit date: 1/1/1980    Smokeless tobacco: Never Used    Alcohol use 8.4 oz/week     14 Shots of liquor per week      Comment: couple of shots every night    Drug use: No    Sexual activity: Not on file     Review of Systems   Constitutional: Positive for chills and fatigue. Negative for activity change, appetite change, diaphoresis and unexpected weight change.   HENT: Negative for congestion, dental problem,  drooling, ear discharge, ear pain, facial swelling, mouth sores, nosebleeds, postnasal drip, rhinorrhea, sinus pressure, sneezing, sore throat, trouble swallowing and voice change.    Eyes: Negative for photophobia, pain, discharge, redness, itching and visual disturbance.   Respiratory: Positive for cough and shortness of breath. Negative for apnea, chest tightness and wheezing.    Cardiovascular: Positive for leg swelling. Negative for chest pain and palpitations.   Gastrointestinal: Negative for abdominal distention, abdominal pain, blood in stool, constipation, diarrhea, nausea and vomiting.   Genitourinary: Negative for difficulty urinating, dysuria, enuresis, flank pain, frequency, hematuria and urgency.   Musculoskeletal: Negative for arthralgias, back pain, gait problem, joint swelling, myalgias, neck pain and neck stiffness.   Skin: Negative for color change, rash and wound.   Neurological: Positive for headaches. Negative for dizziness, tremors, seizures, syncope, facial asymmetry, speech difficulty, weakness, light-headedness and numbness.   Hematological: Negative for adenopathy. Does not bruise/bleed easily.   Psychiatric/Behavioral: Negative for agitation, behavioral problems, confusion, decreased concentration, dysphoric mood, sleep disturbance and suicidal ideas. The patient is not nervous/anxious.      Objective:     Vital Signs (Most Recent):  Temp: 96.9 °F (36.1 °C) (04/27/17 0400)  Pulse: 66 (04/27/17 0400)  Resp: 20 (04/27/17 0400)  BP: 130/71 (04/27/17 0400)  SpO2: (!) 92 % (04/27/17 0400) Vital Signs (24h Range):  Temp:  [96.5 °F (35.8 °C)-97.8 °F (36.6 °C)] 96.9 °F (36.1 °C)  Pulse:  [55-72] 66  Resp:  [20-24] 20  SpO2:  [82 %-95 %] 92 %  BP: (127-133)/(64-74) 130/71     Weight: 86.2 kg (190 lb)  Body mass index is 25.77 kg/(m^2).    Physical Exam   Constitutional: He is oriented to person, place, and time. He appears well-developed and well-nourished.   HENT:   Head: Normocephalic and  atraumatic.   Right Ear: External ear normal.   Left Ear: External ear normal.   Nose: Nose normal.   Mouth/Throat: Oropharynx is clear and moist.   Eyes: Conjunctivae and EOM are normal. Pupils are equal, round, and reactive to light. Right eye exhibits no discharge. Left eye exhibits no discharge. No scleral icterus.   Neck: Normal range of motion. Neck supple. No JVD present. No tracheal deviation present. No thyromegaly present.   Cardiovascular: Normal rate, regular rhythm, normal heart sounds and intact distal pulses.    No murmur heard.  Pulmonary/Chest: Effort normal. No respiratory distress. He has no wheezes. He has rales. He exhibits no tenderness.   Rales thruout right lung    Abdominal: Soft. Bowel sounds are normal. He exhibits no distension and no mass. There is no tenderness. There is no rebound and no guarding.   Musculoskeletal: Normal range of motion. He exhibits no edema.   Edema upon admission per ED doc, none this am    Lymphadenopathy:     He has no cervical adenopathy.   Neurological: He is alert and oriented to person, place, and time. He has normal reflexes. He displays normal reflexes. No cranial nerve deficit. He exhibits normal muscle tone. Coordination normal.   Skin: Skin is warm and dry.   Psychiatric: He has a normal mood and affect. His behavior is normal. Judgment and thought content normal.        Significant Labs:   CBC:   Recent Labs  Lab 04/26/17 1821 04/27/17  0550   WBC 12.62 11.13   HGB 10.5* 10.6*   HCT 28.8* 28.8*    327     CMP:   Recent Labs  Lab 04/26/17 1821 04/27/17  0550   * 122*   K 4.6 3.7   CL 83* 88*   CO2 24 22*   * 83   BUN 16 13   CREATININE 0.9 0.9   CALCIUM 8.3* 8.2*   PROT 5.9* 6.0   ALBUMIN 2.5* 2.5*   BILITOT 0.9 0.6   ALKPHOS 130 119   AST 39 37   ALT 54* 54*   ANIONGAP 9 12   EGFRNONAA >60 >60     Troponin:   Recent Labs  Lab 04/26/17 1821 04/27/17  0029 04/27/17  0550   TROPONINI <0.006 <0.006 <0.006     TSH:   Recent Labs  Lab  04/12/17  0926   TSH 3.613     BNP    Recent Labs  Lab 04/26/17  1821   *         Significant Imaging: I have reviewed and interpreted all pertinent imaging results/findings within the past 24 hours.   Right sided infiltrate on CXR

## 2017-04-27 NOTE — PROGRESS NOTES
Patient to third floor in wheelchair; telemetry. Report from MOISÉS Clark. Pulse ox 82% on room air, audible wheezing, abdominal muscle use; other vitals stable. Pulse ox up to 92% on 3LNC. IV antibiotics continued. Patient denies needs at this time.

## 2017-04-27 NOTE — PLAN OF CARE
Problem: Patient Care Overview  Goal: Plan of Care Review  Outcome: Ongoing (interventions implemented as appropriate)  Vitals stable; oxygen above 92% on 3LNC. No complaints of pain. Voiding spontaneously; urinal at bedside. Fall precautions maintained; up with standby assistance. IV fluids and antibiotics initiated. Telemetry. BRIAN hose. Breathing treatments refused. Cardiology consulted. Plan of care reviewed with patient; voiced understanding. Will continue to monitor.

## 2017-04-27 NOTE — PLAN OF CARE
04/27/17 1146   Discharge Assessment   Assessment Type Discharge Planning Assessment   Confirmed/corrected address and phone number on facesheet? Yes   Assessment information obtained from? Patient   Expected Length of Stay (days) 3   Communicated expected length of stay with patient/caregiver yes   Type of Healthcare Directive Received Advance Directive   If Healthcare Directive is received, is it scanned into Epic? no (comment)  (Copy requested)   Prior to hospitilization cognitive status: Alert/Oriented   Prior to hospitalization functional status: Independent   Current cognitive status: Alert/Oriented   Current Functional Status: Independent   Arrived From home or self-care   Lives With spouse   Able to Return to Prior Arrangements yes   Is patient able to care for self after discharge? Yes   How many people do you have in your home that can help with your care after discharge? 1   Who are your caregiver(s) and their phone number(s)? Spouse, Sylvain Wolfe [phone]   Patient's perception of discharge disposition home or selfcare   Readmission Within The Last 30 Days no previous admission in last 30 days   Patient currently being followed by outpatient case management? No   Patient currently receives home health services? No   Does the patient currently use HME? No   Patient currently receives private duty nursing? No   Patient currently receives any other outside agency services? No   Equipment Currently Used at Home none   Do you have any problems affording any of your prescribed medications? No   Is the patient taking medications as prescribed? yes   Do you have any financial concerns preventing you from receiving the healthcare you need? No   Does the patient have transportation to healthcare appointments? Yes   Transportation Available family or friend will provide;car   On Dialysis? No   Does the patient receive services at the Coumadin Clinic? No   Are there any open cases? No   Discharge Plan A  Home with family   Discharge Plan B Home with family   Patient/Family In Agreement With Plan yes   Patient is an 86 year old male coming to the hospital from home where he lives with is spouse, Sylvain.  Patient is alert and engages in conversation.  His spouse is present and supportive.  Patient was informed of the observation status and given a signed copy of the MOON form.  He has no questions or concerns for .  No anticipated discharge needs.

## 2017-04-27 NOTE — ASSESSMENT & PLAN NOTE
Elevated BNP on admission; this is new for pt  Consult cards  Obtain daily BNP  Echo today  DC IVF

## 2017-04-27 NOTE — ASSESSMENT & PLAN NOTE
Check a urine sodium, urine creatinine, urine osmolality and serum osmolality to facilitate hypoNa workup  Spot urine na wnl, ? SIADH   Bing overnight, ? From lasix vs from NS IVF  D/C fluids, follow bmp. Wait for ECHO before considering further diuresis or IVF's

## 2017-04-28 PROBLEM — I50.31 ACUTE DIASTOLIC HEART FAILURE: Status: ACTIVE | Noted: 2017-01-01

## 2017-04-28 PROBLEM — J84.112 IPF (IDIOPATHIC PULMONARY FIBROSIS): Status: ACTIVE | Noted: 2017-01-01

## 2017-04-28 NOTE — PROGRESS NOTES
[facility] Medicine  Progress Note    Patient Name: Sandeep Wolfe  MRN: [id number]  Patient Class: IP- Inpatient   Admission Date: 4/26/2017  Length of Stay: 1 days  Attending Physician: Juan José Berumen MD  Primary Care Provider: Juan José Berumen MD        Subjective:     Principal Problem:Pneumonia of right lung due to infectious organism    HPI:  Patient is 86 year old male that presents to ED with 2 week history of weakness and cough. Been in bed most of this time.  Went to urgent care day Sunday. Was diagnosed with URI and was given zithromax and shot of steroids. Felt better for a couple of days, but yesterday weakness and SOB returned so he came to ED. Work up in ED is positive for left sided pneumonia, significant hyponatremia and heart failure(pt has no history). Given one dose of lasix in ED and placed on floor for IV abx and continue home BB, ARB.     Hospital Course:  Since admission he has been diuresing and sodium is up to 122 from 116 this am and SOB improved. Very stoic fella. No complaints this am.     D-dimer noted to be elevated yesterday, CTA chest done. Right lung with mod multilobar infiltrates. Both lungs with mild honeycombing cysts and reticular septal thickening suggestive of Usual interstitial pneumonia ie clinnical idiopathic pulmonary fibrosis versus other interstitial lung disease. Both lungs with multifocal mild scattered centrilobular groundglass lung opacities. Differential is broad including active interstitial lung disease, hypersensitivity pneumonitis, interstitial edema, opportunistic/atypical infection, bronchopneumonia, viral infections, and hemorrhage/vascilitis. Moderate right pleural effusion. Small left pleural effusion. Small sized gastric hiatal hernia.     WBC zabrina overnight, Dr. Berumen changed a/b from Rocephin and Azithromycin (s/p 2 days) to Vanc and Primaxin 4/28/17.     Reports breathing better this am but his O2 requirements  have increased to 4L.     HypoNa improving. Dr. alcantara came by this AM and stopped fluids and gave a dose of lasix already.       Interval History: feeling better this am.     Review of Systems   Constitutional: Positive for appetite change (poor PO intake 2 weeks). Negative for activity change, chills, diaphoresis, fatigue and unexpected weight change.   HENT: Negative for congestion, dental problem, drooling, ear discharge, ear pain, facial swelling, mouth sores, nosebleeds, postnasal drip, rhinorrhea, sinus pressure, sneezing, sore throat, trouble swallowing and voice change.    Eyes: Negative for photophobia, pain, discharge, redness, itching and visual disturbance.   Respiratory: Positive for cough and shortness of breath. Negative for apnea, chest tightness and wheezing.    Cardiovascular: Negative for chest pain, palpitations and leg swelling.   Gastrointestinal: Negative for abdominal distention, abdominal pain, blood in stool, constipation, diarrhea, nausea and vomiting.   Genitourinary: Negative for difficulty urinating, dysuria, enuresis, flank pain, frequency, hematuria and urgency.   Musculoskeletal: Negative for arthralgias, back pain, gait problem, joint swelling, myalgias, neck pain and neck stiffness.   Skin: Negative for color change, rash and wound.   Neurological: Positive for weakness (generalized) and headaches. Negative for dizziness, tremors, seizures, syncope, facial asymmetry, speech difficulty, light-headedness and numbness.   Hematological: Negative for adenopathy. Does not bruise/bleed easily.   Psychiatric/Behavioral: Negative for agitation, behavioral problems, confusion, decreased concentration, dysphoric mood, sleep disturbance and suicidal ideas. The patient is not nervous/anxious.      Objective:     Vital Signs (Most Recent):  Temp: 97.4 °F (36.3 °C) (04/28/17 0909)  Pulse: 66 (04/28/17 0909)  Resp: 18 (04/28/17 0909)  BP: 123/67 (04/28/17 0909)  SpO2: (!) 90 % (04/28/17 0909)  Vital Signs (24h Range):  Temp:  [96.5 °F (35.8 °C)-98.8 °F (37.1 °C)] 97.4 °F (36.3 °C)  Pulse:  [58-79] 66  Resp:  [16-24] 18  SpO2:  [90 %-93 %] 90 %  BP: (115-147)/(59-74) 123/67     Weight: 86.2 kg (190 lb)  Body mass index is 25.77 kg/(m^2).    Intake/Output Summary (Last 24 hours) at 04/28/17 0919  Last data filed at 04/28/17 0705   Gross per 24 hour   Intake          1596.25 ml   Output             1626 ml   Net           -29.75 ml      Physical Exam   Constitutional: He is oriented to person, place, and time. He appears well-developed and well-nourished.   HENT:   Head: Normocephalic and atraumatic.   Right Ear: External ear normal.   Left Ear: External ear normal.   Nose: Nose normal.   Mouth/Throat: Oropharynx is clear and moist.   Eyes: Conjunctivae and EOM are normal. Pupils are equal, round, and reactive to light. Right eye exhibits no discharge. Left eye exhibits no discharge. No scleral icterus.   Neck: Normal range of motion. Neck supple. No JVD present.   Cardiovascular: Normal rate, regular rhythm, normal heart sounds and intact distal pulses.    No murmur heard.  Pulmonary/Chest: Effort normal. No respiratory distress. He has no wheezes. He has rales. He exhibits no tenderness.   Crackles thruout right lung   Soft r honchi in right lung base today   Abdominal: Soft. Bowel sounds are normal. He exhibits no distension and no mass. There is no tenderness.   Musculoskeletal: Normal range of motion. He exhibits no edema.   Edema upon admission per ED doc, none this am    Lymphadenopathy:     He has no cervical adenopathy.   Neurological: He is alert and oriented to person, place, and time. He has normal reflexes. He displays normal reflexes. No cranial nerve deficit. He exhibits normal muscle tone. Coordination normal.   Skin: Skin is warm and dry.   Psychiatric: He has a normal mood and affect. His behavior is normal.       Significant Labs:   CBC:   Recent Labs  Lab 04/26/17  1821 04/27/17  0550  04/28/17  0432   WBC 12.62 11.13 13.55*   HGB 10.5* 10.6* 10.1*   HCT 28.8* 28.8* 27.6*    327 370*     CMP:   Recent Labs  Lab 04/26/17  1821 04/27/17  0550 04/28/17  0432   * 122* 121*   K 4.6 3.7 3.9   CL 83* 88* 89*   CO2 24 22* 21*   * 83 89   BUN 16 13 11   CREATININE 0.9 0.9 0.8   CALCIUM 8.3* 8.2* 7.9*   PROT 5.9* 6.0 5.5*   ALBUMIN 2.5* 2.5* 2.3*   BILITOT 0.9 0.6 0.5   ALKPHOS 130 119 122   AST 39 37 43*   ALT 54* 54* 71*   ANIONGAP 9 12 11   EGFRNONAA >60 >60 >60     Respiratory Culture: to be collected    Troponin:   Recent Labs  Lab 04/27/17  0029 04/27/17  0550 04/27/17  1208   TROPONINI <0.006 <0.006 <0.006       Significant Imaging: CT: I have reviewed all pertinent results/findings within the past 24 hours and my personal findings are:  reviewed  CXR: I have reviewed all pertinent results/findings within the past 24 hours and my personal findings are:  reviewed    Assessment/Plan:      * Pneumonia of right lung due to infectious organism  Started rocephin and azithromycin, day 2; change a/b to Vanc and Primaxin 4/28/17 (by dr. Berumen) as WBC rising  levalbuterol q 6 hours WA  BC x 2; no growth to date  Sputum cx not colelcted, no specimen  Follow CBC, trending up today      Hyponatremia  Check a urine sodium, urine creatinine, urine osmolality and serum osmolality to facilitate hypoNa workup  So far looks like renal losses(FENA >1%)  Bing overnight, ? From lasix vs from NS IVF  D/C fluids, follow bmp. Wait for ECHO before considering further diuresis or IVF's   Will push one time dose of lasix 20 mg x 1 today    (fluids and lasix ordered by Dr. Berumen this AM)    I will follow Na and adjust as needed but alert, oriented and significant improved over 48 hours    Acute diastolic heart failure  Elevated BNP on admission; this is new for pt  Consult cards  Echo with diastolic dysfunction with 60% EF  DC IVF, x 1 Lasix 20 mg 4/28/17  Noted effusions      Essential  hypertension  Amlodipine, ARB, BB  Well controlled overall      Legionella pneumonia  There was suspicionon admission for this due to infection plus hypoNa    Was covered with macrolid on admission (2 days) then broadened coverage as WBC trending up (4/28) and hypoxia worsening (4L NC, sats 90%)    I think this is less likely at this point but a consideration. I think this is probably a more typical organism superimposed on IPF. Will see how patient progresses      IPF (idiopathic pulmonary fibrosis)  Based on CT findings  Started steroids today to see if improves sx and oxygenation  WBC will no longer be helpful in guiding antibx    If no improvement may need pulm to eval as well      VTE Risk Mitigation         Ordered     enoxaparin injection 40 mg  Daily     Route:  Subcutaneous        04/27/17 1857     Medium Risk of VTE  Once      04/26/17 2022     Place BRIAN hose  Until discontinued      04/26/17 2022          Amie Green MD  Department of Hospital Medicine   [facility]

## 2017-04-28 NOTE — PLAN OF CARE
Problem: Patient Care Overview  Goal: Plan of Care Review  Outcome: Ongoing (interventions implemented as appropriate)  Vitals stable, afebrile, no signs of distress. Pt denies any pain but complains of SOB with exertion. Pt on 4L NC due to desaturation in oxygen. Pt IV abx changed today to Vancomycin Q12hr and Primaxin Q 6hr. WBC is 13.55 today. BNP at 344. 60mg of IV lasix given today. IV steroids started daily. Pt encouraged to move around more. Lung sounds are diminished. Family at bedside. Lovenox daily. Pt agrees with plan of care, no questions at this time.

## 2017-04-28 NOTE — ASSESSMENT & PLAN NOTE
Started rocephin and azithromycin, day 2; change a/b to Vanc and Primaxin 4/28/17 (by dr. Berumen) as WBC rising  levalbuterol q 6 hours WA  BC x 2; no growth to date  Sputum cx not colelcted, no specimen  Follow CBC, trending up today

## 2017-04-28 NOTE — ASSESSMENT & PLAN NOTE
There was suspicionon admission for this due to infection plus hypoNa    Was covered with macrolid on admission (2 days) then broadened coverage as WBC trending up (4/28) and hypoxia worsening (4L NC, sats 90%)    I think this is less likely at this point but a consideration. I think this is probably a more typical organism superimposed on IPF. Will see how patient progresses

## 2017-04-28 NOTE — PLAN OF CARE
Problem: Patient Care Overview  Goal: Plan of Care Review  Outcome: Ongoing (interventions implemented as appropriate)  Rested well. Oxygen increased to 4L. Patient denies any increase in shortness of breath. No visible distress. IV fluids. Telemetry. Voiding spontaneously, using urinal. V/S stable. Afebrile. Patient understands plan of care and agrees.

## 2017-04-28 NOTE — PROGRESS NOTES
Bedside report received from MOISÉS Esqueda. IV fluids initiated. Oxygen continued per nasal cannula. Denies needs. No distress noted.

## 2017-04-28 NOTE — H&P
[facility] Medicine  History & Physical    Patient Name: Sandeep Wolfe  MRN: [id number]  Admission Date: 4/26/2017  Attending Physician: Juan José Berumen MD   Primary Care Provider: Juan José Berumen MD         Patient information was obtained from patient and ER records.     Subjective:     Principal Problem:Pneumonia of right lung due to infectious organism    Chief Complaint:   Chief Complaint   Patient presents with    URI     cold symptoms for 10 days    Bloated     for about 3 days .  states last BM yesterday but it was small        HPI: Patient is 86 year old male that presents to ED with 2 week history of weakness and cough. Been in bed most of this time.  Went to urgent care day Sunday. Was diagnosed with URI and was given zithromax and shot of steroids. Felt better for a couple of days, but yesterday weakness and SOB returned so he came to ED. Work up in ED is positive for left sided pneumonia, significant hyponatremia and heart failure(pt has no history). Given one dose of lasix in ED and placed on floor for IV abx and continue home BB, ARB.     Past Medical History:   Diagnosis Date    BPH (benign prostatic hyperplasia)     Cataract     Hyperlipidemia     Hypertension     Kidney stone        Past Surgical History:   Procedure Laterality Date    APPENDECTOMY      LASER OF PROSTATE W/ GREEN LIGHT PVP      TONSILLECTOMY         Review of patient's allergies indicates:   Allergen Reactions    Livalo [pitavastatin] Other (See Comments)     Muscle cramps    Tribenzor [olmesartan-amlodipin-hcthiazid] Other (See Comments)     Muscle cramps       No current facility-administered medications on file prior to encounter.      Current Outpatient Prescriptions on File Prior to Encounter   Medication Sig    amlodipine (NORVASC) 10 MG tablet TAKE 1 TABLET DAILY    calcium-vitamin D 500-125 mg-unit tablet Take 1 tablet by mouth 2 (two) times daily.      fish oil-omega-3  fatty acids 300-1,000 mg capsule Take 2 g by mouth once daily.      glucosamine-chondroitin 500-400 mg tablet Take 1 tablet by mouth 3 (three) times daily.      losartan (COZAAR) 100 MG tablet TAKE 1 TABLET DAILY    metoprolol tartrate (LOPRESSOR) 25 MG tablet Take 1 tablet (25 mg total) by mouth 2 (two) times daily.    multivitamin with minerals tablet Take 1 tablet by mouth once daily.      naproxen (NAPROSYN) 500 MG tablet Take 1 tablet (500 mg total) by mouth 2 (two) times daily as needed.     Family History     Problem Relation (Age of Onset)    Cancer Father    Stroke Mother        Social History Main Topics    Smoking status: Former Smoker     Types: Cigarettes     Quit date: 1/1/1980    Smokeless tobacco: Never Used    Alcohol use 8.4 oz/week     14 Shots of liquor per week      Comment: couple of shots every night    Drug use: No    Sexual activity: Not on file     Review of Systems   Constitutional: Positive for chills and fatigue. Negative for activity change, appetite change, diaphoresis and unexpected weight change.   HENT: Negative for congestion, dental problem, drooling, ear discharge, ear pain, facial swelling, mouth sores, nosebleeds, postnasal drip, rhinorrhea, sinus pressure, sneezing, sore throat, trouble swallowing and voice change.    Eyes: Negative for photophobia, pain, discharge, redness, itching and visual disturbance.   Respiratory: Positive for cough and shortness of breath. Negative for apnea, chest tightness and wheezing.    Cardiovascular: Positive for leg swelling. Negative for chest pain and palpitations.   Gastrointestinal: Negative for abdominal distention, abdominal pain, blood in stool, constipation, diarrhea, nausea and vomiting.   Genitourinary: Negative for difficulty urinating, dysuria, enuresis, flank pain, frequency, hematuria and urgency.   Musculoskeletal: Negative for arthralgias, back pain, gait problem, joint swelling, myalgias, neck pain and neck stiffness.    Skin: Negative for color change, rash and wound.   Neurological: Positive for headaches. Negative for dizziness, tremors, seizures, syncope, facial asymmetry, speech difficulty, weakness, light-headedness and numbness.   Hematological: Negative for adenopathy. Does not bruise/bleed easily.   Psychiatric/Behavioral: Negative for agitation, behavioral problems, confusion, decreased concentration, dysphoric mood, sleep disturbance and suicidal ideas. The patient is not nervous/anxious.      Objective:     Vital Signs (Most Recent):  Temp: 96.9 °F (36.1 °C) (04/27/17 0400)  Pulse: 66 (04/27/17 0400)  Resp: 20 (04/27/17 0400)  BP: 130/71 (04/27/17 0400)  SpO2: (!) 92 % (04/27/17 0400) Vital Signs (24h Range):  Temp:  [96.5 °F (35.8 °C)-97.8 °F (36.6 °C)] 96.9 °F (36.1 °C)  Pulse:  [55-72] 66  Resp:  [20-24] 20  SpO2:  [82 %-95 %] 92 %  BP: (127-133)/(64-74) 130/71     Weight: 86.2 kg (190 lb)  Body mass index is 25.77 kg/(m^2).    Physical Exam   Constitutional: He is oriented to person, place, and time. He appears well-developed and well-nourished.   HENT:   Head: Normocephalic and atraumatic.   Right Ear: External ear normal.   Left Ear: External ear normal.   Nose: Nose normal.   Mouth/Throat: Oropharynx is clear and moist.   Eyes: Conjunctivae and EOM are normal. Pupils are equal, round, and reactive to light. Right eye exhibits no discharge. Left eye exhibits no discharge. No scleral icterus.   Neck: Normal range of motion. Neck supple. No JVD present. No tracheal deviation present. No thyromegaly present.   Cardiovascular: Normal rate, regular rhythm, normal heart sounds and intact distal pulses.    No murmur heard.  Pulmonary/Chest: Effort normal. No respiratory distress. He has no wheezes. He has rales. He exhibits no tenderness.   Rales thruout right lung    Abdominal: Soft. Bowel sounds are normal. He exhibits no distension and no mass. There is no tenderness. There is no rebound and no guarding.    Musculoskeletal: Normal range of motion. He exhibits no edema.   Edema upon admission per ED doc, none this am    Lymphadenopathy:     He has no cervical adenopathy.   Neurological: He is alert and oriented to person, place, and time. He has normal reflexes. He displays normal reflexes. No cranial nerve deficit. He exhibits normal muscle tone. Coordination normal.   Skin: Skin is warm and dry.   Psychiatric: He has a normal mood and affect. His behavior is normal. Judgment and thought content normal.        Significant Labs:   CBC:   Recent Labs  Lab 04/26/17 1821 04/27/17  0550   WBC 12.62 11.13   HGB 10.5* 10.6*   HCT 28.8* 28.8*    327     CMP:   Recent Labs  Lab 04/26/17 1821 04/27/17  0550   * 122*   K 4.6 3.7   CL 83* 88*   CO2 24 22*   * 83   BUN 16 13   CREATININE 0.9 0.9   CALCIUM 8.3* 8.2*   PROT 5.9* 6.0   ALBUMIN 2.5* 2.5*   BILITOT 0.9 0.6   ALKPHOS 130 119   AST 39 37   ALT 54* 54*   ANIONGAP 9 12   EGFRNONAA >60 >60     Troponin:   Recent Labs  Lab 04/26/17  1821 04/27/17  0029 04/27/17  0550   TROPONINI <0.006 <0.006 <0.006     TSH:   Recent Labs  Lab 04/12/17  0926   TSH 3.613     BNP    Recent Labs  Lab 04/26/17  1821   *         Significant Imaging: I have reviewed and interpreted all pertinent imaging results/findings within the past 24 hours.   Right sided infiltrate on CXR       Assessment/Plan:     Hyponatremia  Check a urine sodium, urine creatinine, urine osmolality and serum osmolality to facilitate hypoNa workup  So far looks like renal losses(FENA >1%)  Bing overnight, ? From lasix vs from NS IVF  D/C fluids, follow bmp. Wait for ECHO before considering further diuresis or IVF's     VTE Risk Mitigation         Ordered     enoxaparin injection 40 mg  Daily     Route:  Subcutaneous        04/27/17 1857     Medium Risk of VTE  Once      04/26/17 2022     Place BRIAN hose  Until discontinued      04/26/17 2022        Juan José Berumen MD  Department of Hospital  Medicine   [facility]

## 2017-04-28 NOTE — SUBJECTIVE & OBJECTIVE
Interval History: feeling better this am.     Review of Systems   Constitutional: Positive for appetite change (poor PO intake 2 weeks). Negative for activity change, chills, diaphoresis, fatigue and unexpected weight change.   HENT: Negative for congestion, dental problem, drooling, ear discharge, ear pain, facial swelling, mouth sores, nosebleeds, postnasal drip, rhinorrhea, sinus pressure, sneezing, sore throat, trouble swallowing and voice change.    Eyes: Negative for photophobia, pain, discharge, redness, itching and visual disturbance.   Respiratory: Positive for cough and shortness of breath. Negative for apnea, chest tightness and wheezing.    Cardiovascular: Negative for chest pain, palpitations and leg swelling.   Gastrointestinal: Negative for abdominal distention, abdominal pain, blood in stool, constipation, diarrhea, nausea and vomiting.   Genitourinary: Negative for difficulty urinating, dysuria, enuresis, flank pain, frequency, hematuria and urgency.   Musculoskeletal: Negative for arthralgias, back pain, gait problem, joint swelling, myalgias, neck pain and neck stiffness.   Skin: Negative for color change, rash and wound.   Neurological: Positive for weakness (generalized) and headaches. Negative for dizziness, tremors, seizures, syncope, facial asymmetry, speech difficulty, light-headedness and numbness.   Hematological: Negative for adenopathy. Does not bruise/bleed easily.   Psychiatric/Behavioral: Negative for agitation, behavioral problems, confusion, decreased concentration, dysphoric mood, sleep disturbance and suicidal ideas. The patient is not nervous/anxious.      Objective:     Vital Signs (Most Recent):  Temp: 97.4 °F (36.3 °C) (04/28/17 0909)  Pulse: 66 (04/28/17 0909)  Resp: 18 (04/28/17 0909)  BP: 123/67 (04/28/17 0909)  SpO2: (!) 90 % (04/28/17 0909) Vital Signs (24h Range):  Temp:  [96.5 °F (35.8 °C)-98.8 °F (37.1 °C)] 97.4 °F (36.3 °C)  Pulse:  [58-79] 66  Resp:  [16-24]  18  SpO2:  [90 %-93 %] 90 %  BP: (115-147)/(59-74) 123/67     Weight: 86.2 kg (190 lb)  Body mass index is 25.77 kg/(m^2).    Intake/Output Summary (Last 24 hours) at 04/28/17 0919  Last data filed at 04/28/17 0705   Gross per 24 hour   Intake          1596.25 ml   Output             1626 ml   Net           -29.75 ml      Physical Exam   Constitutional: He is oriented to person, place, and time. He appears well-developed and well-nourished.   HENT:   Head: Normocephalic and atraumatic.   Right Ear: External ear normal.   Left Ear: External ear normal.   Nose: Nose normal.   Mouth/Throat: Oropharynx is clear and moist.   Eyes: Conjunctivae and EOM are normal. Pupils are equal, round, and reactive to light. Right eye exhibits no discharge. Left eye exhibits no discharge. No scleral icterus.   Neck: Normal range of motion. Neck supple. No JVD present.   Cardiovascular: Normal rate, regular rhythm, normal heart sounds and intact distal pulses.    No murmur heard.  Pulmonary/Chest: Effort normal. No respiratory distress. He has no wheezes. He has rales. He exhibits no tenderness.   Crackles thruout right lung   Soft r honchi in right lung base today   Abdominal: Soft. Bowel sounds are normal. He exhibits no distension and no mass. There is no tenderness.   Musculoskeletal: Normal range of motion. He exhibits no edema.   Edema upon admission per ED doc, none this am    Lymphadenopathy:     He has no cervical adenopathy.   Neurological: He is alert and oriented to person, place, and time. He has normal reflexes. He displays normal reflexes. No cranial nerve deficit. He exhibits normal muscle tone. Coordination normal.   Skin: Skin is warm and dry.   Psychiatric: He has a normal mood and affect. His behavior is normal.       Significant Labs:   CBC:   Recent Labs  Lab 04/26/17  1821 04/27/17  0550 04/28/17  0432   WBC 12.62 11.13 13.55*   HGB 10.5* 10.6* 10.1*   HCT 28.8* 28.8* 27.6*    327 370*     CMP:   Recent  Labs  Lab 04/26/17  1821 04/27/17  0550 04/28/17  0432   * 122* 121*   K 4.6 3.7 3.9   CL 83* 88* 89*   CO2 24 22* 21*   * 83 89   BUN 16 13 11   CREATININE 0.9 0.9 0.8   CALCIUM 8.3* 8.2* 7.9*   PROT 5.9* 6.0 5.5*   ALBUMIN 2.5* 2.5* 2.3*   BILITOT 0.9 0.6 0.5   ALKPHOS 130 119 122   AST 39 37 43*   ALT 54* 54* 71*   ANIONGAP 9 12 11   EGFRNONAA >60 >60 >60     Respiratory Culture: to be collected    Troponin:   Recent Labs  Lab 04/27/17  0029 04/27/17  0550 04/27/17  1208   TROPONINI <0.006 <0.006 <0.006       Significant Imaging: CT: I have reviewed all pertinent results/findings within the past 24 hours and my personal findings are:  reviewed  CXR: I have reviewed all pertinent results/findings within the past 24 hours and my personal findings are:  reviewed

## 2017-04-28 NOTE — ASSESSMENT & PLAN NOTE
Elevated BNP on admission; this is new for pt  Consult cards  Echo with diastolic dysfunction with 60% EF  DC IVF, x 1 Lasix 20 mg 4/28/17  Noted effusions

## 2017-04-28 NOTE — PROGRESS NOTES
[facility]  Cardiology  Progress Note    Patient Name: Sandeep Wolfe  MRN: [id number]  Admission Date: 4/26/2017  Hospital Length of Stay: 1 days  Code Status: Full Code   Attending Physician: Juan José Berumen MD   Primary Care Physician: Juan José Berumen MD  Expected Discharge Date:   Principal Problem:Pneumonia of right lung due to infectious organism    Subjective:       Interval History: 86 year old w/m with HX of dyslipidemia, BPH, HTN presents to ED after being seen at urgent care for URI symptoms that have been lasting approximatley 10 days. He reports increased generalized weakness and abdominal bloating. He denies cough or significant SOB. He was diagnosed with hyponatremia and Rt sided pneumonia. Patient reports frequent coughing non productive throughout the night. CTA of chest revealed no evidence of PE.        ROS   Constitutional: generalized weakness  Eyes: Negative   Respiratory: cough   Cardiovascular: Negative   Gastrointestinal: abdominal bloating    Genitourinary: Negative   Musculoskeletal: Negative   Skin: Negative .   Neurological: Negative   Objective:     Vital Signs (Most Recent):  Temp: 97.4 °F (36.3 °C) (04/28/17 0909)  Pulse: 61 (04/28/17 1000)  Resp: 18 (04/28/17 0909)  BP: 123/67 (04/28/17 0909)  SpO2: (!) 90 % (04/28/17 0909) Vital Signs (24h Range):  Temp:  [96.5 °F (35.8 °C)-98.8 °F (37.1 °C)] 97.4 °F (36.3 °C)  Pulse:  [58-79] 61  Resp:  [16-24] 18  SpO2:  [90 %-93 %] 90 %  BP: (115-147)/(65-74) 123/67     Weight: 86.2 kg (190 lb)  Body mass index is 25.77 kg/(m^2).    SpO2: (!) 90 %  O2 Device (Oxygen Therapy): nasal cannula      Intake/Output Summary (Last 24 hours) at 04/28/17 1124  Last data filed at 04/28/17 1020   Gross per 24 hour   Intake          1836.25 ml   Output             1651 ml   Net           185.25 ml       Lines/Drains/Airways     Peripheral Intravenous Line                 Peripheral IV - Single Lumen 04/26/17 2113 Left Forearm 1 day                 Physical Exam  General appearance: alert, appears stated age and cooperative  Head: Normocephalic, without obvious abnormality, atraumatic  Eyes: conjunctivae/corneas clear. PERRL  Neck: no carotid bruit, no JVD and supple, symmetrical, trachea midline  Lungs: clear to auscultation bilaterally, normal respiratory effort  Chest Wall: no tenderness  Heart: regular rate and rhythm, S1, S2 normal, no murmur, click, rub or gallop  Abdomen: soft, non-tender; bowel sounds normal; no masses, no organomegaly  Extremities: Extremities normal, atraumatic, no cyanosis, clubbing, or edema  Pulses: Dorsalis Pedis R: 2+ (normal)/L: 2+ (normal)  Skin: Skin color, texture, turgor normal. No rashes or lesions  Neurologic: generalized weakness  Alert and oriented X 3  Significant Labs: All pertinent lab results from the last 24 hours have been reviewed.    Significant Imaging: CT scan: CT ABDOMEN PELVIS WITH CONTRAST: No results found for this visit on 04/26/17. and CT ABDOMEN PELVIS WITHOUT CONTRAST: No results found for this visit on 04/26/17., Echocardiogram:   2D echo with color flow doppler:   Results for orders placed or performed during the hospital encounter of 04/26/17   2D echo with color flow doppler   Result Value Ref Range    EF 60 55 - 65    Diastolic Dysfunction Yes (A)     Est. PA Systolic Pressure 35.28     Tricuspid Valve Regurgitation TRIVIAL    , EKG:  Assessment and Plan:       Active Diagnoses:    Diagnosis Date Noted POA    PRINCIPAL PROBLEM:  Pneumonia of right lung due to infectious organism [J18.9] 04/27/2017 Yes    Acute diastolic heart failure [I50.31] 04/27/2017 Yes    Essential hypertension [I10] 04/27/2017 Yes    Legionella pneumonia [A48.1] 04/27/2017 Yes    Hyponatremia [E87.1] 04/26/2017 Yes      Problems Resolved During this Admission:    Diagnosis Date Noted Date Resolved POA       VTE Risk Mitigation         Ordered     enoxaparin injection 40 mg  Daily     Route:  Subcutaneous         04/27/17 1857     Medium Risk of VTE  Once      04/26/17 2022     Place BRIAN hose  Until discontinued      04/26/17 2022      Hyponatremia- sodium level has improved from 116-121 today; probable inap SiADH  Rt lung infiltrate- negative for PE, patient reports frequent cough throughout the night, currently being treated with ABX therapy  No evidence of CHF at this time- EF 60%, mild diastolic dysfunction,   HTN- well controlled      PLAN:DVT prophylaxis  Check DDimer  Continue amlodipine,losartan,metoprolol  Echo with good LV systolic function.  Patient is being treated for pneumonia.  Echo with good systolic function and CTA without evidence of PE.  Clinically no improvement and some evidence of volume excess on exam.  I would like to give dose of Lasix 40mg IV now and in 12 hours and reassess in the am.    Patient seen and examined and agree with above.    Adal Best, URIELP  Cardiology  [facility]

## 2017-04-28 NOTE — NURSING
Bedside report complete with MOISÉS Prabhakar; Pt resting quietly, no c/o or concerns at this time. 4L NC & continuous IV fluids at 75ml/hr maintained.

## 2017-04-28 NOTE — ASSESSMENT & PLAN NOTE
Check a urine sodium, urine creatinine, urine osmolality and serum osmolality to facilitate hypoNa workup  So far looks like renal losses(FENA >1%)  Bing overnight, ? From lasix vs from NS IVF  D/C fluids, follow bmp. Wait for ECHO before considering further diuresis or IVF's   Will push one time dose of lasix 20 mg x 1 today    (fluids and lasix ordered by Dr. Berumen this AM)    I will follow Na and adjust as needed but alert, oriented and significant improved over 48 hours

## 2017-04-28 NOTE — ASSESSMENT & PLAN NOTE
Based on CT findings  Started steroids today to see if improves sx and oxygenation  WBC will no longer be helpful in guiding antibx    If no improvement may need pulm to eval as well

## 2017-04-28 NOTE — PLAN OF CARE
04/28/17 1017   Discharge Reassessment   Assessment Type Discharge Planning Reassessment   Patient will benefit with home health upon discharge.  His wife was given a list and they would like [facility].  Patient may also need oxygen upon discharge.  Patient is not anticipated to be discharged over the weekend.

## 2017-04-29 NOTE — PROGRESS NOTES
Pt wanted to try being on nasal cannula again. Pt denies SOB. Pt placed on 4L NC and has oxygen saturation at 93-94%. Tolerating well.

## 2017-04-29 NOTE — ASSESSMENT & PLAN NOTE
Started rocephin and azithromycin, day 2; change a/b to Vanc and Primaxin 4/28/17 (by dr. Berumen) as WBC rising  levalbuterol q 6 hours WA  BC x 2; no growth to date  Sputum cx not colelcted, no specimen  Follow CBC, trending up today due to steroids  No fevers  Lung sounds are better

## 2017-04-29 NOTE — PROGRESS NOTES
[facility] Hospital Medicine  Progress Note    Patient Name: Sandeep Wolfe  MRN: [id number]  Patient Class: IP- Inpatient   Admission Date: 4/26/2017  Length of Stay: 2 days  Attending Physician: Juan José Berumen MD  Primary Care Provider: Juan José Berumen MD        Subjective:     Principal Problem:Pneumonia of right lung due to infectious organism    HPI:  Patient is 86 year old male that presents to ED with 2 week history of weakness and cough. Been in bed most of this time.  Went to urgent care day Sunday. Was diagnosed with URI and was given zithromax and shot of steroids. Felt better for a couple of days, but yesterday weakness and SOB returned so he came to ED. Work up in ED is positive for left sided pneumonia, significant hyponatremia and heart failure(pt has no history). Given one dose of lasix in ED and placed on floor for IV abx and continue home BB, ARB.     Hospital Course:  Since admission he has been diuresing and sodium is up to 122 from 116 this am and SOB improved. Very stoic fella. No complaints this am.     D-dimer noted to be elevated yesterday, CTA chest done. Right lung with mod multilobar infiltrates. Both lungs with mild honeycombing cysts and reticular septal thickening suggestive of Usual interstitial pneumonia ie clinnical idiopathic pulmonary fibrosis versus other interstitial lung disease. Both lungs with multifocal mild scattered centrilobular groundglass lung opacities. Differential is broad including active interstitial lung disease, hypersensitivity pneumonitis, interstitial edema, opportunistic/atypical infection, bronchopneumonia, viral infections, and hemorrhage/vascilitis. Moderate right pleural effusion. Small left pleural effusion. Small sized gastric hiatal hernia.     WBC zabrina overnight, Dr. Berumen changed a/b from Rocephin and Azithromycin (s/p 2 days) to Vanc and Primaxin 4/28/17.   Reports breathing better this am but his O2 requirements have  increased to 4L.   HypoNa improving. Dr. alcantara came by this AM and stopped fluids and gave a dose of lasix already.     4/29/17  He feels better this AM. Breathing is less labored, still with a cough that is not productive. Remains on 4L NC with sats 90%. Eating better today. WBC 20K s/p 126mg solumedrol yesterday, weaning down steroids today but no fevers. Na remains 122.       Interval History: feeling better this AM    Review of Systems   Constitutional: Positive for appetite change (poor PO intake 2 week; better today). Negative for activity change, chills, diaphoresis, fatigue and unexpected weight change.   HENT: Negative for congestion, dental problem, drooling, ear discharge, ear pain, facial swelling, mouth sores, nosebleeds, postnasal drip, rhinorrhea, sinus pressure, sneezing, sore throat, trouble swallowing and voice change.    Eyes: Negative for photophobia, pain, discharge, redness, itching and visual disturbance.   Respiratory: Positive for cough and shortness of breath. Negative for apnea, chest tightness and wheezing.    Cardiovascular: Negative for chest pain, palpitations and leg swelling.   Gastrointestinal: Negative for abdominal distention, abdominal pain, blood in stool, constipation, diarrhea, nausea and vomiting.   Genitourinary: Negative for difficulty urinating, dysuria, enuresis, flank pain, frequency, hematuria and urgency.   Musculoskeletal: Negative for arthralgias, back pain, gait problem, joint swelling, myalgias, neck pain and neck stiffness.   Skin: Negative for color change, rash and wound.   Neurological: Positive for weakness (generalized) and headaches. Negative for dizziness, tremors, seizures, syncope, facial asymmetry, speech difficulty, light-headedness and numbness.   Hematological: Negative for adenopathy. Does not bruise/bleed easily.   Psychiatric/Behavioral: Negative for agitation, behavioral problems, confusion, decreased concentration, dysphoric mood, sleep  disturbance and suicidal ideas. The patient is not nervous/anxious.      Objective:     Vital Signs (Most Recent):  Temp: 97.5 °F (36.4 °C) (04/29/17 0835)  Pulse: 72 (04/29/17 0835)  Resp: 18 (04/29/17 0835)  BP: 125/68 (04/29/17 0835)  SpO2: (!) 94 % (04/29/17 0835) Vital Signs (24h Range):  Temp:  [95.2 °F (35.1 °C)-98.4 °F (36.9 °C)] 97.5 °F (36.4 °C)  Pulse:  [56-79] 72  Resp:  [18] 18  SpO2:  [90 %-94 %] 94 %  BP: (114-126)/(64-72) 125/68     Weight: 86.2 kg (190 lb)  Body mass index is 25.77 kg/(m^2).    Intake/Output Summary (Last 24 hours) at 04/29/17 0938  Last data filed at 04/29/17 0837   Gross per 24 hour   Intake             1580 ml   Output             3650 ml   Net            -2070 ml      Physical Exam   Constitutional: He is oriented to person, place, and time. He appears well-developed and well-nourished.   HENT:   Head: Normocephalic and atraumatic.   Right Ear: External ear normal.   Left Ear: External ear normal.   Nose: Nose normal.   Mouth/Throat: Oropharynx is clear and moist.   Eyes: Conjunctivae and EOM are normal. Pupils are equal, round, and reactive to light. Right eye exhibits no discharge. Left eye exhibits no discharge. No scleral icterus.   Neck: Normal range of motion. Neck supple. No JVD present.   Cardiovascular: Normal rate, regular rhythm, normal heart sounds and intact distal pulses.    No murmur heard.  Pulmonary/Chest: Effort normal. No respiratory distress. He has no wheezes. He has no rales. He exhibits no tenderness.   Lungs are much more clear today   Abdominal: Soft. Bowel sounds are normal. He exhibits no distension and no mass. There is no tenderness.   Musculoskeletal: Normal range of motion. He exhibits no edema.   Lymphadenopathy:     He has no cervical adenopathy.   Neurological: He is alert and oriented to person, place, and time. He has normal reflexes. He displays normal reflexes. No cranial nerve deficit. He exhibits normal muscle tone. Coordination normal.    Skin: Skin is warm and dry.   Psychiatric: He has a normal mood and affect. His behavior is normal.   Nursing note and vitals reviewed.      Significant Labs:   CBC:   Recent Labs  Lab 04/28/17  0432 04/29/17  0550   WBC 13.55* 20.00*   HGB 10.1* 10.3*   HCT 27.6* 28.5*   * 434*     CMP:   Recent Labs  Lab 04/28/17  0432 04/29/17  0550   * 122*   K 3.9 4.1   CL 89* 88*   CO2 21* 21*   GLU 89 140*   BUN 11 18   CREATININE 0.8 0.9   CALCIUM 7.9* 8.5*   PROT 5.5* 6.1   ALBUMIN 2.3* 2.5*   BILITOT 0.5 0.5   ALKPHOS 122 129   AST 43* 51*   ALT 71* 95*   ANIONGAP 11 13   EGFRNONAA >60 >60     All pertinent labs within the past 24 hours have been reviewed.    Significant Imaging: I have reviewed all pertinent imaging results/findings within the past 24 hours.    Assessment/Plan:      * Pneumonia of right lung due to infectious organism  Started rocephin and azithromycin, day 2; change a/b to Vanc and Primaxin 4/28/17 (by dr. Berumen) as WBC rising  levalbuterol q 6 hours WA  BC x 2; no growth to date  Sputum cx not colelcted, no specimen  Follow CBC, trending up today due to steroids  No fevers  Lung sounds are better      Hyponatremia  Check a urine sodium, urine creatinine, urine osmolality and serum osmolality to facilitate hypoNa workup    Hypotonic hyponatremia, appears euvolemic with urine Na >20. I think SIADH. HOWEVER, he seems to be responding to IVF so possibly more volume down on admission (todd not eating prior to admission) than he appears to be now THEREFORE will cont gentle IVF and monitor Na  TTE does show diastolic dysfunction  Got 20mg IV lasix 4/28    Discussed with patient, if any worsening SOB let us know and will stop IVF    Acute diastolic heart failure  Elevated BNP on admission; this is new for pt  Consult cards  Echo with diastolic dysfunction with 60% EF  DC IVF, x 1 Lasix 20 mg 4/28/17  Noted effusions but will do slow IVF for hypoNa today      Essential hypertension  Amlodipine,  ARB, BB  Well controlled overall      Legionella pneumonia  There was suspicionon admission for this due to infection plus hypoNa    Was covered with macrolid on admission (2 days) then broadened coverage as WBC trending up (4/28) and hypoxia worsening (4L NC, sats 90%)    I think this is less likely at this point but a consideration. I think this is probably a more typical organism superimposed on IPF. Will see how patient progresses      IPF (idiopathic pulmonary fibrosis)  Based on CT findings  Started steroids today to see if improves sx and oxygenation  WBC will no longer be helpful in guiding antibx    If no improvement may need pulm to eval as well but seems to be a bit better today      VTE Risk Mitigation         Ordered     enoxaparin injection 40 mg  Daily     Route:  Subcutaneous        04/27/17 1857     Medium Risk of VTE  Once      04/26/17 2022     Place BRIAN hose  Until discontinued      04/26/17 2022          Amie Green MD  Department of Hospital Medicine   [facility]

## 2017-04-29 NOTE — SUBJECTIVE & OBJECTIVE
Interval History: feeling better this AM    Review of Systems   Constitutional: Positive for appetite change (poor PO intake 2 week; better today). Negative for activity change, chills, diaphoresis, fatigue and unexpected weight change.   HENT: Negative for congestion, dental problem, drooling, ear discharge, ear pain, facial swelling, mouth sores, nosebleeds, postnasal drip, rhinorrhea, sinus pressure, sneezing, sore throat, trouble swallowing and voice change.    Eyes: Negative for photophobia, pain, discharge, redness, itching and visual disturbance.   Respiratory: Positive for cough and shortness of breath. Negative for apnea, chest tightness and wheezing.    Cardiovascular: Negative for chest pain, palpitations and leg swelling.   Gastrointestinal: Negative for abdominal distention, abdominal pain, blood in stool, constipation, diarrhea, nausea and vomiting.   Genitourinary: Negative for difficulty urinating, dysuria, enuresis, flank pain, frequency, hematuria and urgency.   Musculoskeletal: Negative for arthralgias, back pain, gait problem, joint swelling, myalgias, neck pain and neck stiffness.   Skin: Negative for color change, rash and wound.   Neurological: Positive for weakness (generalized) and headaches. Negative for dizziness, tremors, seizures, syncope, facial asymmetry, speech difficulty, light-headedness and numbness.   Hematological: Negative for adenopathy. Does not bruise/bleed easily.   Psychiatric/Behavioral: Negative for agitation, behavioral problems, confusion, decreased concentration, dysphoric mood, sleep disturbance and suicidal ideas. The patient is not nervous/anxious.      Objective:     Vital Signs (Most Recent):  Temp: 97.5 °F (36.4 °C) (04/29/17 0835)  Pulse: 72 (04/29/17 0835)  Resp: 18 (04/29/17 0835)  BP: 125/68 (04/29/17 0835)  SpO2: (!) 94 % (04/29/17 0835) Vital Signs (24h Range):  Temp:  [95.2 °F (35.1 °C)-98.4 °F (36.9 °C)] 97.5 °F (36.4 °C)  Pulse:  [56-79] 72  Resp:  [18]  18  SpO2:  [90 %-94 %] 94 %  BP: (114-126)/(64-72) 125/68     Weight: 86.2 kg (190 lb)  Body mass index is 25.77 kg/(m^2).    Intake/Output Summary (Last 24 hours) at 04/29/17 0938  Last data filed at 04/29/17 0837   Gross per 24 hour   Intake             1580 ml   Output             3650 ml   Net            -2070 ml      Physical Exam   Constitutional: He is oriented to person, place, and time. He appears well-developed and well-nourished.   HENT:   Head: Normocephalic and atraumatic.   Right Ear: External ear normal.   Left Ear: External ear normal.   Nose: Nose normal.   Mouth/Throat: Oropharynx is clear and moist.   Eyes: Conjunctivae and EOM are normal. Pupils are equal, round, and reactive to light. Right eye exhibits no discharge. Left eye exhibits no discharge. No scleral icterus.   Neck: Normal range of motion. Neck supple. No JVD present.   Cardiovascular: Normal rate, regular rhythm, normal heart sounds and intact distal pulses.    No murmur heard.  Pulmonary/Chest: Effort normal. No respiratory distress. He has no wheezes. He has no rales. He exhibits no tenderness.   Lungs are much more clear today   Abdominal: Soft. Bowel sounds are normal. He exhibits no distension and no mass. There is no tenderness.   Musculoskeletal: Normal range of motion. He exhibits no edema.   Lymphadenopathy:     He has no cervical adenopathy.   Neurological: He is alert and oriented to person, place, and time. He has normal reflexes. He displays normal reflexes. No cranial nerve deficit. He exhibits normal muscle tone. Coordination normal.   Skin: Skin is warm and dry.   Psychiatric: He has a normal mood and affect. His behavior is normal.   Nursing note and vitals reviewed.      Significant Labs:   CBC:   Recent Labs  Lab 04/28/17  0432 04/29/17  0550   WBC 13.55* 20.00*   HGB 10.1* 10.3*   HCT 27.6* 28.5*   * 434*     CMP:   Recent Labs  Lab 04/28/17  0432 04/29/17  0550   * 122*   K 3.9 4.1   CL 89* 88*   CO2  21* 21*   GLU 89 140*   BUN 11 18   CREATININE 0.8 0.9   CALCIUM 7.9* 8.5*   PROT 5.5* 6.1   ALBUMIN 2.3* 2.5*   BILITOT 0.5 0.5   ALKPHOS 122 129   AST 43* 51*   ALT 71* 95*   ANIONGAP 11 13   EGFRNONAA >60 >60     All pertinent labs within the past 24 hours have been reviewed.    Significant Imaging: I have reviewed all pertinent imaging results/findings within the past 24 hours.

## 2017-04-29 NOTE — PROGRESS NOTES
Called to pt room, up on entering pt is complaining of shortness of breath, and is using abdominal muscles and is breathing 32/min on 4Land oxygen saturation is 82%. He states it started after he finished eating. IV ABX/fluids stopped. Respiratory called, and Pt increased to 6L and oxygen saturation only went up to 87%. 50% Venti mask was then placed. MD notified who states to order a 2 view chest x ray and to keep both the ABX and Fluids off for now.

## 2017-04-29 NOTE — PROGRESS NOTES
Nursing Notes  Bedside report received. Pt sleeping. No acute distress noted. Oxygen in use.      Huddle Comments

## 2017-04-29 NOTE — PLAN OF CARE
Problem: Patient Care Overview  Goal: Plan of Care Review  Outcome: Ongoing (interventions implemented as appropriate)  Quiet and restful nite. Still has dyspnea on exertion. Congested cough. Tolerating IVPB antibiotics well. Lamin hose and nonskid socks in use. O2 per N/C at 4L/Min. Plan of care discussed during care and verbalizes understanding. CLAIR Perdomo RN

## 2017-04-29 NOTE — ASSESSMENT & PLAN NOTE
Elevated BNP on admission; this is new for pt  Consult cards  Echo with diastolic dysfunction with 60% EF  DC IVF, x 1 Lasix 20 mg 4/28/17  Noted effusions but will do slow IVF for hypoNa today

## 2017-04-29 NOTE — NURSING
Report received and assessment completed. O2 in use per N/C at 4l/min. and tolerating well. C/O dyspnea with exertion. Right sided breath sounds diminished and coarse breath sounds. Saline lock intact and cardiac monitor in use. BRIAN hose and nonskid socks in use. Call bell and urinal in reach. Plan of care discussed and verbalizes understanding. ANDREW Monte

## 2017-04-29 NOTE — ASSESSMENT & PLAN NOTE
Check a urine sodium, urine creatinine, urine osmolality and serum osmolality to facilitate hypoNa workup    Hypotonic hyponatremia, appears euvolemic with urine Na >20. I think SIADH. HOWEVER, he seems to be responding to IVF so possibly more volume down on admission (todd not eating prior to admission) than he appears to be now THEREFORE will cont gentle IVF and monitor Na  TTE does show diastolic dysfunction  Got 20mg IV lasix 4/28    Discussed with patient, if any worsening SOB let us know and will stop IVF

## 2017-04-29 NOTE — ASSESSMENT & PLAN NOTE
Based on CT findings  Started steroids today to see if improves sx and oxygenation  WBC will no longer be helpful in guiding antibx    If no improvement may need pulm to eval as well but seems to be a bit better today

## 2017-04-29 NOTE — PROGRESS NOTES
MD aware of x ray results. No new orders noted. She states to keep IVF off, and try to wean him back to nasal cannula in a little while. Respiratory and pt aware. Pt denies any SOB at this time.

## 2017-04-30 NOTE — ASSESSMENT & PLAN NOTE
Check a urine sodium, urine creatinine, urine osmolality and serum osmolality to facilitate hypoNa workup    Hypotonic hyponatremia, appears euvolemic with urine Na >20. I think SIADH. Tried IVF and seems to have more SOB. TTE does show diastolic dysfunction  Got 20mg IV lasix 4/28    Na up to 125 today, asymptomatic. Hold on further fluids and diuretics and see if will start correctly on its own. OK for PO intake as he is doing

## 2017-04-30 NOTE — ASSESSMENT & PLAN NOTE
There was suspicionon admission for this due to infection plus hypoNa    Was covered with macrolid on admission (2 days) then broadened coverage as WBC trending up (4/28) and hypoxia worsening (4L NC, sats 90%)    I think this is less likely at this point but a consideration. I think this is probably a more typical organism superimposed on IPF. Will see how patient progresses but seems to be doing OK off macrolide

## 2017-04-30 NOTE — ASSESSMENT & PLAN NOTE
Based on CT findings  Started steroids to see if improves sx and oxygenation  WBC will no longer be helpful in guiding antibx    Will have Dr. Tate take a look today as well to see if any other therapy needed. Will need outpatient pulm follow up and may need home O2

## 2017-04-30 NOTE — ASSESSMENT & PLAN NOTE
Elevated BNP on admission; this is new for pt  Consult cards  Echo with diastolic dysfunction with 60% EF  DC IVF, x 1 Lasix 20 mg 4/28/17  Noted effusions  Given Na issues hold on more diuresis today but consider more in the future to see if helps O2 sats

## 2017-04-30 NOTE — ASSESSMENT & PLAN NOTE
Started rocephin and azithromycin, day 2; change a/b to Vanc and Primaxin 4/28/17 (by dr. Berumen) as WBC rising  levalbuterol q 6 hours WA  BC x 2; no growth to date  Sputum cx not colelcted, no specimen  Follow CBC, trending up today due to steroids  No fevers  Lung sounds are better    Stop Banc 4/30. Cont primaxin

## 2017-04-30 NOTE — PROGRESS NOTES
[facility] Medicine  Progress Note    Patient Name: Sandeep Wolfe  MRN: [id number]  Patient Class: IP- Inpatient   Admission Date: 4/26/2017  Length of Stay: 3 days  Attending Physician: Juan José Berumen MD  Primary Care Provider: Juan José Berumen MD        Subjective:     Principal Problem:Pneumonia of right lung due to infectious organism    HPI:  Patient is 86 year old male that presents to ED with 2 week history of weakness and cough. Been in bed most of this time.  Went to urgent care day Sunday. Was diagnosed with URI and was given zithromax and shot of steroids. Felt better for a couple of days, but yesterday weakness and SOB returned so he came to ED. Work up in ED is positive for left sided pneumonia, significant hyponatremia and heart failure(pt has no history). Given one dose of lasix in ED and placed on floor for IV abx and continue home BB, ARB.     Hospital Course:  Since admission he has been diuresing and sodium is up to 122 from 116 this am and SOB improved. Very stoic fella. No complaints this am.     D-dimer noted to be elevated yesterday, CTA chest done. Right lung with mod multilobar infiltrates. Both lungs with mild honeycombing cysts and reticular septal thickening suggestive of Usual interstitial pneumonia ie clinnical idiopathic pulmonary fibrosis versus other interstitial lung disease. Both lungs with multifocal mild scattered centrilobular groundglass lung opacities. Differential is broad including active interstitial lung disease, hypersensitivity pneumonitis, interstitial edema, opportunistic/atypical infection, bronchopneumonia, viral infections, and hemorrhage/vascilitis. Moderate right pleural effusion. Small left pleural effusion. Small sized gastric hiatal hernia.     WBC zabrina overnight, Dr. Berumen changed a/b from Rocephin and Azithromycin (s/p 2 days) to Vanc and Primaxin 4/28/17.   Reports breathing better this am but his O2 requirements have  increased to 4L.   HypoNa improving. Dr. alcantara came by this AM and stopped fluids and gave a dose of lasix already.     4/29/17  He feels better this AM. Breathing is less labored, still with a cough that is not productive. Remains on 4L NC with sats 90%. Eating better today. WBC 20K s/p 126mg solumedrol yesterday, weaning down steroids today but no fevers. Na remains 122.     4/30  Subjectively feel SOB is better however on 6L NC this AM with sats low 90s. WBC 33K, normal granulocytes and no fevers. CXR yesterday a bit better upper lobe areation on the right.       Interval History: SOB is better this am, walked to bathroom    Review of Systems   Constitutional: Negative for activity change, fatigue, fever and unexpected weight change.   HENT: Negative for congestion, ear pain, hearing loss, rhinorrhea and sore throat.    Eyes: Negative for pain, redness and visual disturbance.   Respiratory: Positive for cough (improving) and shortness of breath (getting better). Negative for wheezing.    Cardiovascular: Negative for chest pain, palpitations and leg swelling.   Gastrointestinal: Negative for abdominal pain, constipation, diarrhea, nausea and vomiting.   Genitourinary: Negative for decreased urine volume, dysuria, frequency and urgency.   Musculoskeletal: Negative for back pain, joint swelling and neck pain.   Skin: Negative for color change, rash and wound.   Neurological: Negative for dizziness, weakness, light-headedness and headaches.     Objective:     Vital Signs (Most Recent):  Temp: 96.4 °F (35.8 °C) (04/30/17 0701)  Pulse: 63 (04/30/17 0724)  Resp: 20 (04/30/17 0724)  BP: 133/65 (04/30/17 0701)  SpO2: (!) 93 % (04/30/17 0724) Vital Signs (24h Range):  Temp:  [96.4 °F (35.8 °C)-97.4 °F (36.3 °C)] 96.4 °F (35.8 °C)  Pulse:  [58-77] 63  Resp:  [18-32] 20  SpO2:  [82 %-97 %] 93 %  BP: (111-133)/(62-76) 133/65     Weight: 86.2 kg (190 lb)  Body mass index is 25.77 kg/(m^2).    Intake/Output Summary (Last 24  hours) at 04/30/17 1059  Last data filed at 04/30/17 0906   Gross per 24 hour   Intake             1090 ml   Output             2000 ml   Net             -910 ml      Physical Exam   Constitutional: He is oriented to person, place, and time. He appears well-developed and well-nourished. No distress.   HENT:   Head: Normocephalic and atraumatic.   Right Ear: External ear normal.   Left Ear: External ear normal.   Eyes: Conjunctivae and EOM are normal. Pupils are equal, round, and reactive to light. Right eye exhibits no discharge. Left eye exhibits no discharge.   Neck: Neck supple. No tracheal deviation present.   Cardiovascular: Normal rate and regular rhythm.  Exam reveals no gallop and no friction rub.    No murmur heard.  Pulmonary/Chest: Effort normal. No respiratory distress. He has wheezes. He has rales (worse on the right lung base, some improvement from admission however).   Abdominal: Soft. Bowel sounds are normal. He exhibits no distension. There is no tenderness.   Neurological: He is alert and oriented to person, place, and time. No cranial nerve deficit.   Skin: Skin is warm and dry.   Psychiatric: He has a normal mood and affect. His behavior is normal.   Nursing note and vitals reviewed.      Significant Labs:   CBC:   Recent Labs  Lab 04/29/17  0550 04/30/17  0700   WBC 20.00* 33.28*   HGB 10.3* 11.0*   HCT 28.5* 30.4*   * 475*     CMP:   Recent Labs  Lab 04/29/17  0550 04/30/17  0659   * 125*   K 4.1 5.1   CL 88* 91*   CO2 21* 22*   * 109   BUN 18 22   CREATININE 0.9 1.0   CALCIUM 8.5* 8.6*   PROT 6.1 6.1   ALBUMIN 2.5* 2.6*   BILITOT 0.5 0.6   ALKPHOS 129 121   AST 51* 74*   ALT 95* 137*   ANIONGAP 13 12   EGFRNONAA >60 >60     All pertinent labs within the past 24 hours have been reviewed.    Significant Imaging: I have reviewed all pertinent imaging results/findings within the past 24 hours.    Assessment/Plan:      * Pneumonia of right lung due to infectious  organism  Started rocephin and azithromycin, day 2; change a/b to Vanc and Primaxin 4/28/17 (by dr. Berumen) as WBC rising  levalbuterol q 6 hours WA  BC x 2; no growth to date  Sputum cx not colelcted, no specimen  Follow CBC, trending up today due to steroids  No fevers  Lung sounds are better    Stop Banc 4/30. Cont primaxin      Hyponatremia  Check a urine sodium, urine creatinine, urine osmolality and serum osmolality to facilitate hypoNa workup    Hypotonic hyponatremia, appears euvolemic with urine Na >20. I think SIADH. Tried IVF and seems to have more SOB. TTE does show diastolic dysfunction  Got 20mg IV lasix 4/28    Na up to 125 today, asymptomatic. Hold on further fluids and diuretics and see if will start correctly on its own. OK for PO intake as he is doing    Acute diastolic heart failure  Elevated BNP on admission; this is new for pt  Consult cards  Echo with diastolic dysfunction with 60% EF  DC IVF, x 1 Lasix 20 mg 4/28/17  Noted effusions  Given Na issues hold on more diuresis today but consider more in the future to see if helps O2 sats      Essential hypertension  Amlodipine, ARB, BB  Well controlled overall      Legionella pneumonia  There was suspicionon admission for this due to infection plus hypoNa    Was covered with macrolid on admission (2 days) then broadened coverage as WBC trending up (4/28) and hypoxia worsening (4L NC, sats 90%)    I think this is less likely at this point but a consideration. I think this is probably a more typical organism superimposed on IPF. Will see how patient progresses but seems to be doing OK off macrolide      IPF (idiopathic pulmonary fibrosis)  Based on CT findings  Started steroids to see if improves sx and oxygenation  WBC will no longer be helpful in guiding antibx    Will have Dr. Tate take a look today as well to see if any other therapy needed. Will need outpatient pulm follow up and may need home O2      VTE Risk Mitigation         Ordered      enoxaparin injection 40 mg  Daily     Route:  Subcutaneous        04/27/17 1857     Medium Risk of VTE  Once      04/26/17 2022     Place BRIAN hose  Until discontinued      04/26/17 2022          Amie Green MD  Department of [place] [facility]   [facility]

## 2017-04-30 NOTE — NURSING
Pt complaining of shortness of breath just sitting in bed; coarse breath sounds right side. Oxygen sats 89-91% on 6L high flow nasal cannula. Pulmonology consulted. New orders noted.

## 2017-04-30 NOTE — SUBJECTIVE & OBJECTIVE
Interval History: SOB is better this am, walked to bathroom    Review of Systems   Constitutional: Negative for activity change, fatigue, fever and unexpected weight change.   HENT: Negative for congestion, ear pain, hearing loss, rhinorrhea and sore throat.    Eyes: Negative for pain, redness and visual disturbance.   Respiratory: Positive for cough (improving) and shortness of breath (getting better). Negative for wheezing.    Cardiovascular: Negative for chest pain, palpitations and leg swelling.   Gastrointestinal: Negative for abdominal pain, constipation, diarrhea, nausea and vomiting.   Genitourinary: Negative for decreased urine volume, dysuria, frequency and urgency.   Musculoskeletal: Negative for back pain, joint swelling and neck pain.   Skin: Negative for color change, rash and wound.   Neurological: Negative for dizziness, weakness, light-headedness and headaches.     Objective:     Vital Signs (Most Recent):  Temp: 96.4 °F (35.8 °C) (04/30/17 0701)  Pulse: 63 (04/30/17 0724)  Resp: 20 (04/30/17 0724)  BP: 133/65 (04/30/17 0701)  SpO2: (!) 93 % (04/30/17 0724) Vital Signs (24h Range):  Temp:  [96.4 °F (35.8 °C)-97.4 °F (36.3 °C)] 96.4 °F (35.8 °C)  Pulse:  [58-77] 63  Resp:  [18-32] 20  SpO2:  [82 %-97 %] 93 %  BP: (111-133)/(62-76) 133/65     Weight: 86.2 kg (190 lb)  Body mass index is 25.77 kg/(m^2).    Intake/Output Summary (Last 24 hours) at 04/30/17 1059  Last data filed at 04/30/17 0906   Gross per 24 hour   Intake             1090 ml   Output             2000 ml   Net             -910 ml      Physical Exam   Constitutional: He is oriented to person, place, and time. He appears well-developed and well-nourished. No distress.   HENT:   Head: Normocephalic and atraumatic.   Right Ear: External ear normal.   Left Ear: External ear normal.   Eyes: Conjunctivae and EOM are normal. Pupils are equal, round, and reactive to light. Right eye exhibits no discharge. Left eye exhibits no discharge.   Neck:  Neck supple. No tracheal deviation present.   Cardiovascular: Normal rate and regular rhythm.  Exam reveals no gallop and no friction rub.    No murmur heard.  Pulmonary/Chest: Effort normal. No respiratory distress. He has wheezes. He has rales (worse on the right lung base, some improvement from admission however).   Abdominal: Soft. Bowel sounds are normal. He exhibits no distension. There is no tenderness.   Neurological: He is alert and oriented to person, place, and time. No cranial nerve deficit.   Skin: Skin is warm and dry.   Psychiatric: He has a normal mood and affect. His behavior is normal.   Nursing note and vitals reviewed.      Significant Labs:   CBC:   Recent Labs  Lab 04/29/17  0550 04/30/17  0700   WBC 20.00* 33.28*   HGB 10.3* 11.0*   HCT 28.5* 30.4*   * 475*     CMP:   Recent Labs  Lab 04/29/17  0550 04/30/17  0659   * 125*   K 4.1 5.1   CL 88* 91*   CO2 21* 22*   * 109   BUN 18 22   CREATININE 0.9 1.0   CALCIUM 8.5* 8.6*   PROT 6.1 6.1   ALBUMIN 2.5* 2.6*   BILITOT 0.5 0.6   ALKPHOS 129 121   AST 51* 74*   ALT 95* 137*   ANIONGAP 13 12   EGFRNONAA >60 >60     All pertinent labs within the past 24 hours have been reviewed.    Significant Imaging: I have reviewed all pertinent imaging results/findings within the past 24 hours.

## 2017-04-30 NOTE — NURSING
Report received. Patient resting in bed. AAOx3. Urinal at bedside. Addressed questions and concerns. Call bell within reach. Patient instructed to call with needs.

## 2017-04-30 NOTE — PLAN OF CARE
Problem: Patient Care Overview  Goal: Plan of Care Review  Outcome: Ongoing (interventions implemented as appropriate)  Fall precautions maintained. Patient remains free from falls/injuries. IV ABX administered. Denies SOB except on exertion. He states he feels better today. Sats in upper 90s on 4L of O2. Telemetry #11- NSR. IS used. Plan of care reviewed with patient; patient agrees with plan of care.

## 2017-04-30 NOTE — PLAN OF CARE
Problem: Patient Care Overview  Goal: Plan of Care Review  Outcome: Ongoing (interventions implemented as appropriate)  Pt admitted with right lung pneumonia. WBC increased to 33 today. On IV solumedrol 80mg daily. Afebrile. Breath sounds diminished and coarse. Occasional non productive cough.  Pt complained of shortness of breath while sitting in bed; oxygen sats <90 % on 6L high flow nasal cannula. Placed pt on Bipap per pulmonology recs. Tolerating well. Discontinued vancomycin; remains on primaxin. Sodium level increased to 125 today. Sinus rhythm on telemetry. Call bell within reach. Bed in low, locked position. Reviewed plan of care with pt and family, state understanding.

## 2017-05-01 PROBLEM — J96.90 RESPIRATORY FAILURE: Status: ACTIVE | Noted: 2017-01-01

## 2017-05-01 PROBLEM — I10 ESSENTIAL HYPERTENSION: Chronic | Status: ACTIVE | Noted: 2017-01-01

## 2017-05-01 PROBLEM — D63.8 ANEMIA OF CHRONIC DISEASE: Status: ACTIVE | Noted: 2017-01-01

## 2017-05-01 PROBLEM — R74.8 ABNORMAL LIVER ENZYMES: Status: ACTIVE | Noted: 2017-01-01

## 2017-05-01 PROBLEM — J96.01 ACUTE RESPIRATORY FAILURE WITH HYPOXIA: Status: ACTIVE | Noted: 2017-01-01

## 2017-05-01 PROBLEM — D72.829 LEUKOCYTOSIS: Status: ACTIVE | Noted: 2017-01-01

## 2017-05-01 NOTE — H&P
[facility] Medicine  History & Physical    Patient Name: Sandeep Wolfe  MRN: [id number]  Admission Date: 5/1/2017  Attending Physician: Heath Rao MD  Primary Care Provider: Juan José Berumen MD         Patient information was obtained from patient, spouse/SO, past medical records and ER records.     Subjective:     Principal Problem:Acute respiratory failure with hypoxia    Chief Complaint: No chief complaint on file.       HPI: Mr. Wolfe is an 85 yo WM with HTN that was transferred to [facility] for evaluation by Pulmonology. He presented to [facility] on 4/26/17 for evaluation of fatigue, decreased oral intake, cough and SOB/WOOD which had been going on for the prior week. He had one episode of night sweats, but never had a recorded fever. He reported going to an urgent care clinic 4 days prior to presentation there where he received a steroid shot and was started on azithromycin. He did not get any better with those things. He then was admitted to [facility] and started on ceftriaxone and azithromycin. He did not improve and his WBC count in creased. He was then started on vancomycin and primaxin for this. He underwent a CTA chest that was negative for PTE, but was read as concern for UIP and pneumonia. He was started on soulmedrol on 4/28. Despite these interventions his respiratory status and oxygen requirement worsened. He was transferred to [facility] for continued care and speciality care.     Past Medical History:   Diagnosis Date    Arthritis     BPH (benign prostatic hyperplasia)     Cataract     CHF (congestive heart failure)     Hyperlipidemia     Hypertension     Kidney stone        Past Surgical History:   Procedure Laterality Date    APPENDECTOMY      LASER OF PROSTATE W/ GREEN LIGHT PVP      TONSILLECTOMY         Review of patient's allergies indicates:   Allergen Reactions    Livalo [pitavastatin] Other (See Comments)     Muscle cramps    Tribenzor  [olmesartan-amlodipin-hcthiazid] Other (See Comments)     Muscle cramps       Current Facility-Administered Medications on File Prior to Encounter   Medication    [COMPLETED] lorazepam tablet 1 mg    [DISCONTINUED] acetaminophen tablet 650 mg    [DISCONTINUED] albuterol-ipratropium 2.5mg-0.5mg/3mL nebulizer solution 3 mL    [DISCONTINUED] amlodipine tablet 10 mg    [DISCONTINUED] azithromycin 500 mg in dextrose 5 % 250 mL IVPB (ready to mix system)    [DISCONTINUED] enoxaparin injection 40 mg    [DISCONTINUED] ibuprofen tablet 200 mg    [DISCONTINUED] ibuprofen tablet 200 mg    [DISCONTINUED] imipenem-cilastatin (PRIMAXIN) 500 mg in sodium chloride 0.9 % 100 mL IVPB (add-ease)    [DISCONTINUED] lactulose 20 gram/30 mL solution Soln 30 g    [DISCONTINUED] losartan tablet 100 mg    [DISCONTINUED] methylPREDNISolone sodium succinate injection 80 mg    [DISCONTINUED] metoprolol tartrate (LOPRESSOR) tablet 25 mg    [DISCONTINUED] montelukast tablet 10 mg    [DISCONTINUED] ondansetron injection 4 mg    [DISCONTINUED] vancomycin 1 g in dextrose 5 % 250 mL IVPB (ready to mix system)     Current Outpatient Prescriptions on File Prior to Encounter   Medication Sig    amlodipine (NORVASC) 10 MG tablet TAKE 1 TABLET DAILY    calcium-vitamin D 500-125 mg-unit tablet Take 1 tablet by mouth 2 (two) times daily.      fish oil-omega-3 fatty acids 300-1,000 mg capsule Take 2 g by mouth once daily.      glucosamine-chondroitin 500-400 mg tablet Take 1 tablet by mouth 3 (three) times daily.      losartan (COZAAR) 100 MG tablet TAKE 1 TABLET DAILY    metoprolol tartrate (LOPRESSOR) 25 MG tablet Take 1 tablet (25 mg total) by mouth 2 (two) times daily.    multivitamin with minerals tablet Take 1 tablet by mouth once daily.      naproxen (NAPROSYN) 500 MG tablet Take 1 tablet (500 mg total) by mouth 2 (two) times daily as needed.     Family History     Problem Relation (Age of Onset)    Cancer Father    Stroke  Mother        Social History Main Topics    Smoking status: Former Smoker     Packs/day: 4.00     Years: 35.00     Types: Cigarettes     Quit date: 1/1/1980    Smokeless tobacco: Never Used    Alcohol use 8.4 oz/week     14 Shots of liquor per week      Comment: couple of shots every night    Drug use: No    Sexual activity: No     Review of Systems   Constitutional: Positive for fatigue. Negative for appetite change, chills, fever and unexpected weight change.   HENT: Negative for congestion, hearing loss, nosebleeds, sore throat and tinnitus.    Eyes: Negative for discharge, itching and visual disturbance.   Respiratory: Positive for cough and shortness of breath. Negative for chest tightness and wheezing.    Cardiovascular: Negative for chest pain and palpitations.   Gastrointestinal: Negative for abdominal pain, blood in stool, diarrhea, nausea and vomiting.   Endocrine: Negative for cold intolerance and polydipsia.   Genitourinary: Negative for difficulty urinating, dysuria, frequency, hematuria and urgency.   Musculoskeletal: Negative for arthralgias, myalgias and neck stiffness.   Skin: Negative for rash and wound.   Allergic/Immunologic: Negative for environmental allergies and immunocompromised state.   Neurological: Negative for dizziness, tremors and headaches.   Hematological: Does not bruise/bleed easily.   Psychiatric/Behavioral: Negative for agitation and confusion. The patient is not nervous/anxious.      Objective:     Vital Signs (Most Recent):  Pulse: 60 (05/01/17 1420)  Resp: (!) 24 (05/01/17 1420)  BP: 136/70 (05/01/17 1420)  SpO2: (!) 92 % (05/01/17 1420) Vital Signs (24h Range):  Temp:  [96.1 °F (35.6 °C)-98.1 °F (36.7 °C)] 96.1 °F (35.6 °C)  Pulse:  [53-79] 60  Resp:  [19-30] 24  SpO2:  [65 %-98 %] 92 %  BP: (126-146)/(67-74) 136/70     Weight: 85.2 kg (187 lb 13.3 oz)  Body mass index is 25.47 kg/(m^2).    Physical Exam   Constitutional: He is oriented to person, place, and time. He  appears well-developed and well-nourished. He is cooperative. No distress.   HENT:   Head: Normocephalic and atraumatic.   Right Ear: External ear normal.   Left Ear: External ear normal.   Nose: No mucosal edema or sinus tenderness.   Mouth/Throat: Uvula is midline, oropharynx is clear and moist and mucous membranes are normal. No oropharyngeal exudate.   Eyes: Conjunctivae and EOM are normal. Pupils are equal, round, and reactive to light. No scleral icterus.   Neck: Phonation normal. Neck supple. No JVD present. No muscular tenderness present. Carotid bruit is not present. No tracheal deviation present.   Cardiovascular: Normal rate, regular rhythm, S1 normal and S2 normal.    No murmur heard.  Pulses:       Radial pulses are 2+ on the right side, and 2+ on the left side.   Pulmonary/Chest: Effort normal and breath sounds normal. No respiratory distress. He has no wheezes. He has no rales. He exhibits no tenderness and no crepitus.   Abdominal: Soft. Bowel sounds are normal. He exhibits no distension. There is no tenderness. There is no rebound and no guarding.   Musculoskeletal: He exhibits no edema or tenderness.   Lymphadenopathy:        Right cervical: No superficial cervical adenopathy present.       Left cervical: No superficial cervical adenopathy present.        Right: No supraclavicular adenopathy present.        Left: No supraclavicular adenopathy present.   Neurological: He is alert and oriented to person, place, and time. He displays no tremor. He displays no seizure activity.   Skin: Skin is warm and dry. He is not diaphoretic. No cyanosis or erythema. No pallor. Nails show no clubbing.   Psychiatric: He has a normal mood and affect. His behavior is normal. Thought content normal.   Nursing note and vitals reviewed.       Significant Labs:   CBC:   Recent Labs  Lab 04/30/17  0700 05/01/17  0518   WBC 33.28* 34.45*   HGB 11.0* 11.3*   HCT 30.4* 31.2*   * 475*     CMP:   Recent Labs  Lab  04/30/17  0659 05/01/17  0518   * 123*   K 5.1 4.6   CL 91* 92*   CO2 22* 22*    117*   BUN 22 26*   CREATININE 1.0 0.9   CALCIUM 8.6* 8.4*   PROT 6.1 6.0   ALBUMIN 2.6* 2.6*   BILITOT 0.6 0.6   ALKPHOS 121 128   AST 74* 43*   * 118*   ANIONGAP 12 9   EGFRNONAA >60 >60       Significant Imaging: CXR: I have reviewed all pertinent results/findings within the past 24 hours and my personal findings are:  Extensive bilateral interstial lung marking    Assessment/Plan:     * Acute respiratory failure with hypoxia  Presumed bacterial pneumonia of the right lung  Possible IPF vs ARDS vs Pulmonary edema  Consulted pulmonary for evaluation. Continue broad spectrum antibiotics. Pulmonary planning for diuresis. Sending multiple rheum labs. Checking procalcitonin. Will trial on HFNC as oxygenation is his issue primarily and not really gas exchange. Monitor closely in the ICU. Would trial intubation if needed.         Hyponatremia  Could be related to the lung process. Has increased from admission at the OSH. Will continue to monitor.       Essential hypertension  Diastolic dysfunction.   Continue home metoprolol 25 mg BID and amlodipine 10 mg daily. Giving losartan at 25 mg daily which is down from 100 mg daily home dose. Had echo done 4/27/17 that showed normal LVEF with diastolic dysfunction and PASP of 35.       Leukocytosis  Possibly related to the steroids. Will repeat cultures and monitor.       Anemia of chronic disease  Hgb has been stable.       Abnormal liver enzymes  Possibly related to acute illness. Will monitor closely.       VTE Risk Mitigation         Ordered     Medium Risk of VTE  Once      05/01/17 1328     Place BRIAN hose  Until discontinued      05/01/17 1328     Place sequential compression device  Until discontinued      05/01/17 1328        Critical Care Time: 45 Minutes of critical care time was spent in the care of the patient. This included management of organ failures related to  critical illness, titration of continuous infusions, review of pertinent labs and imaging studies, discussion of the patient with consulting services, and discussion of the patients condition with the patient and family.         Heath Rao MD  Department of Hospital Medicine   [facility]

## 2017-05-01 NOTE — DISCHARGE SUMMARY
[facility] Medicine  Discharge Summary      Patient Name: Sandeep Wolfe  MRN: [id number]  Admission Date: 4/26/2017  Hospital Length of Stay: 4 days  Discharge Date and Time:  05/01/2017 12:00 PM  Attending Physician: Juan José Berumen MD   Discharging Provider: Lolis Reyes NP  Primary Care Provider: Juan José Berumen MD      HPI:   Patient is 86 year old male that presents to ED with 2 week history of weakness and cough. Been in bed most of this time.  Went to urgent care day Sunday. Was diagnosed with URI and was given zithromax and shot of steroids. Felt better for a couple of days, but yesterday weakness and SOB returned so he came to ED. Work up in ED is positive for left sided pneumonia, significant hyponatremia and heart failure(pt has no history). Given one dose of lasix in ED and placed on floor for IV abx and continue home BB, ARB.     * No surgery found *      Indwelling Lines/Drains at time of discharge:   Lines/Drains/Airways          No matching active lines, drains, or airways        Hospital Course:   Since admission he has been diuresing and sodium is up to 122 from 116 this am and SOB improved. Very stoic fella. No complaints this am.     D-dimer noted to be elevated yesterday, CTA chest done. Right lung with mod multilobar infiltrates. Both lungs with mild honeycombing cysts and reticular septal thickening suggestive of Usual interstitial pneumonia ie clinnical idiopathic pulmonary fibrosis versus other interstitial lung disease. Both lungs with multifocal mild scattered centrilobular groundglass lung opacities. Differential is broad including active interstitial lung disease, hypersensitivity pneumonitis, interstitial edema, opportunistic/atypical infection, bronchopneumonia, viral infections, and hemorrhage/vascilitis. Moderate right pleural effusion. Small left pleural effusion. Small sized gastric hiatal hernia.     WBC zabrina overnight, Dr. Berumen changed  a/b from Rocephin and Azithromycin (s/p 2 days) to Vanc and Primaxin    4/28/17.   Reports breathing better this am but his O2 requirements have increased to 4L.   HypoNa improving. Dr. alcantara came by this AM and stopped fluids and gave a dose of lasix already.     4/29/17  He feels better this AM. Breathing is less labored, still with a cough that is not productive. Remains on 4L NC with sats 90%. Eating better today. WBC 20K s/p 126mg solumedrol yesterday, weaning down steroids today but no fevers. Na remains 122.     4/30  Subjectively feel SOB is better however on 6L NC this AM with sats low 90s. WBC 33K, normal granulocytes and no fevers. CXR yesterday a bit better upper lobe areation on the right.     5/1  He is now needing bipap since over night to keep oxygenation. Pox 92%. RR 22. Afebrile. He is on vanc, azithromycin and primaxin. Attempted to take off bipap this am for breakfast and sats dropped to 65%. WBC also up to [address]. He is on steroids 80mg IV every 8hr.     His sodium has stabilized 123. No IVF or lasix at this time.     Unsure what has changed in last 24 hours to change his course. He looks like he is not turning the corner although we are 5 days in with abx, nebs, etc. Family at bedside this am. Discussed case and will need transfer for higher level of care (ID/pulm)       Consults:   Consults         Status Ordering Provider     Inpatient consult to Cardiology-[facility]  Once     Provider:  Jeff Ashraf MD    Completed BARBARA WINN     Inpatient consult to Pulmonology  Once     Provider:  Michele Tate MD    Completed LAURA BOYLE          Significant Diagnostic Studies:     CBC:     Recent Labs  Lab 04/30/17  0700 05/01/17  0518   WBC 33.28* 34.45*   HGB 11.0* 11.3*   HCT 30.4* 31.2*   * 475*     CMP:     Recent Labs  Lab 04/30/17  0659 05/01/17  0518   * 123*   K 5.1 4.6   CL 91* 92*   CO2 22* 22*    117*   BUN 22 26*   CREATININE 1.0 0.9   CALCIUM 8.6* 8.4*    PROT 6.1 6.0   ALBUMIN 2.6* 2.6*   BILITOT 0.6 0.6   ALKPHOS 121 128   AST 74* 43*   * 118*   ANIONGAP 12 9   EGFRNONAA >60 >60     All pertinent labs within the past 24 hours have been reviewed.    Significant Imaging:  CXR 4/29  Persistent right lung interstitial and airspace disease with mildly improved aeration right upper lobe.  Small bilateral pleural effusions    CTA  No evidence of pulmonary embolus or acute aortic syndrome.    Bibasal pneumonia and pleural fluid right worse than left.    Head CT Findings consistent with age-related microvascular ischemic changes of the bilateral cerebral white matter.      Pending Diagnostic Studies:     None        Final Active Diagnoses:    Diagnosis Date Noted POA    PRINCIPAL PROBLEM:  Pneumonia of right lung due to infectious organism [J18.9] 04/27/2017 Yes    Acute respiratory failure with hypoxia [J96.01] 05/01/2017 Yes    IPF (idiopathic pulmonary fibrosis) [J84.112] 04/28/2017 Yes    Acute diastolic heart failure [I50.31] 04/27/2017 Yes    Essential hypertension [I10] 04/27/2017 Yes    Legionella pneumonia [A48.1] 04/27/2017 Yes    Hyponatremia [E87.1] 04/26/2017 Yes      Problems Resolved During this Admission:    Diagnosis Date Noted Date Resolved POA      * Pneumonia of right lung due to infectious organism  Started rocephin and azithromycin, day 2; change a/b to Vanc and Primaxin 4/28/17 (by dr. Berumen) as WBC rising  levalbuterol q 6 hours WA  BC x 2; no growth to date  Sputum cx not colelcted, no specimen  Follow CBC, trending up today due to steroids  No fevers    Cont on vanc, primaxin, and azithromycin    Hyponatremia  Check a urine sodium, urine creatinine, urine osmolality and serum osmolality to facilitate hypoNa workup    Hypotonic hyponatremia, appears euvolemic with urine Na >20. I think SIADH. Tried IVF and seems to have more SOB. TTE does show diastolic dysfunction  Got 20mg IV lasix 4/28    Na up to 123 today, asymptomatic. Hold  on further fluids and diuretics and see if will start correctly on its own. OK for PO intake as he is doing    Acute diastolic heart failure  Elevated BNP on admission; this is new for pt  Consult cards  Echo with diastolic dysfunction with 60% EF  DC IVF, x 1 Lasix 20 mg 4/28/17  Noted effusions  Given Na issues hold on more diuresis today but consider more in the future to see if helps O2 sats      Essential hypertension  Amlodipine, ARB, BB  Well controlled overall      Legionella pneumonia  There was suspicionon admission for this due to infection plus hypoNa    Was covered with macrolid on admission (2 days) then broadened coverage as WBC trending up (4/28) and hypoxia worsening (bipap now)    I think this is less likely at this point but a consideration. I think this may be atypical organism superimposed on IPF. unable to get sputum sample and consitioning not improving as ex[ected on broad coverage      IPF (idiopathic pulmonary fibrosis)  Based on CT findings  Started steroids to see if improves sx and oxygenation  WBC will no longer be helpful in guiding antibx although it is continuing to elevate significantly  although his steroids  Are weaning  Dr. Tate consulted and bipap started over night. He is now dependent. Can not take off even to eat without dropping sats      Acute respiratory failure with hypoxia  started bipap, now sats drop to 65 without. His white count continues to climb although he is on vanc, zithromax and promaxin. He remains afebrile. He will be transferred to [facility] to an ICU bed as we need a higher level of care. I/D and pulmonology        Discharged Condition: gaurded     Disposition: Nursing Facility with Pl*    Follow Up:  Follow-up Information     Follow up with Juan José Berumen MD.    Specialty:  Family Medicine    Why:  when discharged from [place]    Contact information:    Travis YUEN [id number] [phone]          Patient Instructions:     Vital  signs per facility protocol     Skin assessment every shift      Full code       Medications: will be addressed at [facility]. He was on vanc, primaine and zithromax her with nebs and steroids as well. Will have I/d see him to assess the need to cont these  Reconciled Home Medications:   Current Discharge Medication List      CONTINUE these medications which have NOT CHANGED    Details   amlodipine (NORVASC) 10 MG tablet TAKE 1 TABLET DAILY  Qty: 90 tablet, Refills: 0    Associated Diagnoses: Essential hypertension      calcium-vitamin D 500-125 mg-unit tablet Take 1 tablet by mouth 2 (two) times daily.        fish oil-omega-3 fatty acids 300-1,000 mg capsule Take 2 g by mouth once daily.        glucosamine-chondroitin 500-400 mg tablet Take 1 tablet by mouth 3 (three) times daily.        losartan (COZAAR) 100 MG tablet TAKE 1 TABLET DAILY  Qty: 90 tablet, Refills: 0    Associated Diagnoses: Essential hypertension      metoprolol tartrate (LOPRESSOR) 25 MG tablet Take 1 tablet (25 mg total) by mouth 2 (two) times daily.  Qty: 180 tablet, Refills: 1    Associated Diagnoses: Essential hypertension      multivitamin with minerals tablet Take 1 tablet by mouth once daily.        naproxen (NAPROSYN) 500 MG tablet Take 1 tablet (500 mg total) by mouth 2 (two) times daily as needed.  Qty: 60 tablet, Refills: 2    Associated Diagnoses: Lumbar stenosis           Time spent on the discharge of patient: 20 minutes    Lolis Reyes NP  Department of Hospital Medicine  [facility]

## 2017-05-01 NOTE — NURSING
Admitted to ICU room 261, parameters monitored and stable. Exchange and placement of transport BiPAP to unit BiPAP without issues. Minimal distress observed. Patient and family oreinted to room, surroundings and procedures.

## 2017-05-01 NOTE — SUBJECTIVE & OBJECTIVE
Interval History: worsening SOB, hypoxic off BiPaP this AM which is a new finding. He appears comfortable despite his low O2 sats and wants to eat breakfast.     Review of Systems   Constitutional: Positive for appetite change (prio to admission, eating better since admission). Negative for activity change, fatigue, fever and unexpected weight change.   HENT: Negative for congestion, ear pain, hearing loss, rhinorrhea and sore throat.    Eyes: Negative for pain, redness and visual disturbance.   Respiratory: Positive for cough and shortness of breath. Negative for wheezing.    Cardiovascular: Negative for chest pain, palpitations and leg swelling.   Gastrointestinal: Negative for abdominal pain, constipation, diarrhea, nausea and vomiting.   Genitourinary: Negative for decreased urine volume, dysuria, frequency and urgency.   Musculoskeletal: Negative for back pain, joint swelling and neck pain.   Skin: Negative for color change, rash and wound.   Neurological: Negative for dizziness, tremors, weakness, light-headedness and headaches.     Objective:     Vital Signs (Most Recent):  Temp: 96.1 °F (35.6 °C) (05/01/17 0321)  Pulse: 66 (05/01/17 1000)  Resp: (!) 30 (05/01/17 0815)  BP: (!) 146/74 (05/01/17 0815)  SpO2: (!) 90 % (05/01/17 0830) Vital Signs (24h Range):  Temp:  [96.1 °F (35.6 °C)-98.1 °F (36.7 °C)] 96.1 °F (35.6 °C)  Pulse:  [53-79] 66  Resp:  [19-30] 30  SpO2:  [65 %-98 %] 90 %  BP: (126-146)/(67-74) 146/74     Weight: 86.2 kg (190 lb)  Body mass index is 25.77 kg/(m^2).    Intake/Output Summary (Last 24 hours) at 05/01/17 1206  Last data filed at 05/01/17 0600   Gross per 24 hour   Intake             1300 ml   Output              750 ml   Net              550 ml      Physical Exam   Constitutional: He is oriented to person, place, and time. He appears well-developed and well-nourished. No distress.   HENT:   Head: Normocephalic and atraumatic.   Right Ear: External ear normal.   Left Ear: External ear  normal.   Eyes: Conjunctivae and EOM are normal. Pupils are equal, round, and reactive to light. Right eye exhibits no discharge. Left eye exhibits no discharge.   Neck: Neck supple. No tracheal deviation present.   Cardiovascular: Normal rate and regular rhythm.  Exam reveals no gallop and no friction rub.    No murmur heard.  Pulmonary/Chest: Effort normal. No respiratory distress. He has wheezes. He has rales. Tenderness: R>L.   Abdominal: Soft. Bowel sounds are normal. He exhibits no distension. There is no tenderness.   Musculoskeletal: He exhibits no edema.   Neurological: He is alert and oriented to person, place, and time. No cranial nerve deficit.   Skin: Skin is warm and dry.   Psychiatric: He has a normal mood and affect. His behavior is normal.   Nursing note and vitals reviewed.      Significant Labs:   CBC:   Recent Labs  Lab 04/30/17  0700 05/01/17  0518   WBC 33.28* 34.45*   HGB 11.0* 11.3*   HCT 30.4* 31.2*   * 475*     CMP:   Recent Labs  Lab 04/30/17  0659 05/01/17  0518   * 123*   K 5.1 4.6   CL 91* 92*   CO2 22* 22*    117*   BUN 22 26*   CREATININE 1.0 0.9   CALCIUM 8.6* 8.4*   PROT 6.1 6.0   ALBUMIN 2.6* 2.6*   BILITOT 0.6 0.6   ALKPHOS 121 128   AST 74* 43*   * 118*   ANIONGAP 12 9   EGFRNONAA >60 >60     Cardiac Markers: No results for input(s): CKMB, MYOGLOBIN, BNP, TROPISTAT in the last 48 hours.  Troponin: No results for input(s): TROPONINI in the last 48 hours.  All pertinent labs within the past 24 hours have been reviewed.    Significant Imaging: I have reviewed all pertinent imaging results/findings within the past 24 hours.

## 2017-05-01 NOTE — PLAN OF CARE
Problem: Patient Care Overview  Goal: Plan of Care Review  Pt received on BIPAP +13/+5 and FiO2 .60  SPO2  92 %.  Pt with mild respiratory distress.  Will continue to monitor.

## 2017-05-01 NOTE — ASSESSMENT & PLAN NOTE
Elevated BNP on admission; this is new for pt  Consult cards  Echo with diastolic dysfunction with 60% EF  DC IVF, x 1 Lasix 20 mg 4/28/17  Noted effusions  We have not been actively diuresis as effusions seems chronic and was getting better until this AM. Diuresis may be helpful as well. Transferred prior to receiving any further diuresis today

## 2017-05-01 NOTE — PROGRESS NOTES
Vancomycin order changed- Loading dose 20 mg/kg 1500 mg and then 15 mg/kg every 12 hours ( per P&T approved pharmacy protocol )  Estimated Creatinine Clearance: 64.7 mL/min (based on Cr of 0.9).

## 2017-05-01 NOTE — SUBJECTIVE & OBJECTIVE
Past Medical History:   Diagnosis Date    Arthritis     BPH (benign prostatic hyperplasia)     Cataract     CHF (congestive heart failure)     Hyperlipidemia     Hypertension     Kidney stone        Past Surgical History:   Procedure Laterality Date    APPENDECTOMY      LASER OF PROSTATE W/ GREEN LIGHT PVP      TONSILLECTOMY         Review of patient's allergies indicates:   Allergen Reactions    Livalo [pitavastatin] Other (See Comments)     Muscle cramps    Tribenzor [olmesartan-amlodipin-hcthiazid] Other (See Comments)     Muscle cramps       Current Facility-Administered Medications on File Prior to Encounter   Medication    [COMPLETED] lorazepam tablet 1 mg    [DISCONTINUED] acetaminophen tablet 650 mg    [DISCONTINUED] albuterol-ipratropium 2.5mg-0.5mg/3mL nebulizer solution 3 mL    [DISCONTINUED] amlodipine tablet 10 mg    [DISCONTINUED] azithromycin 500 mg in dextrose 5 % 250 mL IVPB (ready to mix system)    [DISCONTINUED] enoxaparin injection 40 mg    [DISCONTINUED] ibuprofen tablet 200 mg    [DISCONTINUED] ibuprofen tablet 200 mg    [DISCONTINUED] imipenem-cilastatin (PRIMAXIN) 500 mg in sodium chloride 0.9 % 100 mL IVPB (add-ease)    [DISCONTINUED] lactulose 20 gram/30 mL solution Soln 30 g    [DISCONTINUED] losartan tablet 100 mg    [DISCONTINUED] methylPREDNISolone sodium succinate injection 80 mg    [DISCONTINUED] metoprolol tartrate (LOPRESSOR) tablet 25 mg    [DISCONTINUED] montelukast tablet 10 mg    [DISCONTINUED] ondansetron injection 4 mg    [DISCONTINUED] vancomycin 1 g in dextrose 5 % 250 mL IVPB (ready to mix system)     Current Outpatient Prescriptions on File Prior to Encounter   Medication Sig    amlodipine (NORVASC) 10 MG tablet TAKE 1 TABLET DAILY    calcium-vitamin D 500-125 mg-unit tablet Take 1 tablet by mouth 2 (two) times daily.      fish oil-omega-3 fatty acids 300-1,000 mg capsule Take 2 g by mouth once daily.      glucosamine-chondroitin 500-400 mg  tablet Take 1 tablet by mouth 3 (three) times daily.      losartan (COZAAR) 100 MG tablet TAKE 1 TABLET DAILY    metoprolol tartrate (LOPRESSOR) 25 MG tablet Take 1 tablet (25 mg total) by mouth 2 (two) times daily.    multivitamin with minerals tablet Take 1 tablet by mouth once daily.      naproxen (NAPROSYN) 500 MG tablet Take 1 tablet (500 mg total) by mouth 2 (two) times daily as needed.     Family History     Problem Relation (Age of Onset)    Cancer Father    Stroke Mother        Social History Main Topics    Smoking status: Former Smoker     Packs/day: 4.00     Years: 35.00     Types: Cigarettes     Quit date: 1/1/1980    Smokeless tobacco: Never Used    Alcohol use 8.4 oz/week     14 Shots of liquor per week      Comment: couple of shots every night    Drug use: No    Sexual activity: No     Review of Systems   Constitutional: Positive for fatigue. Negative for appetite change, chills, fever and unexpected weight change.   HENT: Negative for congestion, hearing loss, nosebleeds, sore throat and tinnitus.    Eyes: Negative for discharge, itching and visual disturbance.   Respiratory: Positive for cough and shortness of breath. Negative for chest tightness and wheezing.    Cardiovascular: Negative for chest pain and palpitations.   Gastrointestinal: Negative for abdominal pain, blood in stool, diarrhea, nausea and vomiting.   Endocrine: Negative for cold intolerance and polydipsia.   Genitourinary: Negative for difficulty urinating, dysuria, frequency, hematuria and urgency.   Musculoskeletal: Negative for arthralgias, myalgias and neck stiffness.   Skin: Negative for rash and wound.   Allergic/Immunologic: Negative for environmental allergies and immunocompromised state.   Neurological: Negative for dizziness, tremors and headaches.   Hematological: Does not bruise/bleed easily.   Psychiatric/Behavioral: Negative for agitation and confusion. The patient is not nervous/anxious.      Objective:      Vital Signs (Most Recent):  Pulse: 60 (05/01/17 1420)  Resp: (!) 24 (05/01/17 1420)  BP: 136/70 (05/01/17 1420)  SpO2: (!) 92 % (05/01/17 1420) Vital Signs (24h Range):  Temp:  [96.1 °F (35.6 °C)-98.1 °F (36.7 °C)] 96.1 °F (35.6 °C)  Pulse:  [53-79] 60  Resp:  [19-30] 24  SpO2:  [65 %-98 %] 92 %  BP: (126-146)/(67-74) 136/70     Weight: 85.2 kg (187 lb 13.3 oz)  Body mass index is 25.47 kg/(m^2).    Physical Exam   Constitutional: He is oriented to person, place, and time. He appears well-developed and well-nourished. He is cooperative. No distress.   HENT:   Head: Normocephalic and atraumatic.   Right Ear: External ear normal.   Left Ear: External ear normal.   Nose: No mucosal edema or sinus tenderness.   Mouth/Throat: Uvula is midline, oropharynx is clear and moist and mucous membranes are normal. No oropharyngeal exudate.   Eyes: Conjunctivae and EOM are normal. Pupils are equal, round, and reactive to light. No scleral icterus.   Neck: Phonation normal. Neck supple. No JVD present. No muscular tenderness present. Carotid bruit is not present. No tracheal deviation present.   Cardiovascular: Normal rate, regular rhythm, S1 normal and S2 normal.    No murmur heard.  Pulses:       Radial pulses are 2+ on the right side, and 2+ on the left side.   Pulmonary/Chest: Effort normal and breath sounds normal. No respiratory distress. He has no wheezes. He has no rales. He exhibits no tenderness and no crepitus.   Abdominal: Soft. Bowel sounds are normal. He exhibits no distension. There is no tenderness. There is no rebound and no guarding.   Musculoskeletal: He exhibits no edema or tenderness.   Lymphadenopathy:        Right cervical: No superficial cervical adenopathy present.       Left cervical: No superficial cervical adenopathy present.        Right: No supraclavicular adenopathy present.        Left: No supraclavicular adenopathy present.   Neurological: He is alert and oriented to person, place, and time. He  displays no tremor. He displays no seizure activity.   Skin: Skin is warm and dry. He is not diaphoretic. No cyanosis or erythema. No pallor. Nails show no clubbing.   Psychiatric: He has a normal mood and affect. His behavior is normal. Thought content normal.   Nursing note and vitals reviewed.       Significant Labs:   CBC:   Recent Labs  Lab 04/30/17  0700 05/01/17  0518   WBC 33.28* 34.45*   HGB 11.0* 11.3*   HCT 30.4* 31.2*   * 475*     CMP:   Recent Labs  Lab 04/30/17  0659 05/01/17  0518   * 123*   K 5.1 4.6   CL 91* 92*   CO2 22* 22*    117*   BUN 22 26*   CREATININE 1.0 0.9   CALCIUM 8.6* 8.4*   PROT 6.1 6.0   ALBUMIN 2.6* 2.6*   BILITOT 0.6 0.6   ALKPHOS 121 128   AST 74* 43*   * 118*   ANIONGAP 12 9   EGFRNONAA >60 >60       Significant Imaging: CXR: I have reviewed all pertinent results/findings within the past 24 hours and my personal findings are:  Extensive bilateral interstial lung marking

## 2017-05-01 NOTE — ASSESSMENT & PLAN NOTE
Presumed bacterial pneumonia of the right lung  Possible IPF vs ARDS vs Pulmonary edema  Consulted pulmonary for evaluation. Continue broad spectrum antibiotics. Pulmonary planning for diuresis. Sending multiple rheum labs. Checking procalcitonin. Will trial on HFNC as oxygenation is his issue primarily and not really gas exchange. Monitor closely in the ICU. Would trial intubation if needed.

## 2017-05-01 NOTE — PLAN OF CARE
Call placed to transfer line, spoke with Rey concerning the need for transfer.  Rey stated he would see about getting a bed.  Patient and family aware of this and agree for transfer to another [facility].

## 2017-05-01 NOTE — SUBJECTIVE & OBJECTIVE
Interval History: SOB is worse this am as well is elevated WBC    Review of Systems   Constitutional: Negative for activity change, fatigue, fever and unexpected weight change.   HENT: Negative for congestion, ear pain, hearing loss, rhinorrhea and sore throat.    Eyes: Negative for pain, redness and visual disturbance.   Respiratory: Positive for cough (improving) and shortness of breath (getting better). Negative for wheezing.    Cardiovascular: Negative for chest pain, palpitations and leg swelling.   Gastrointestinal: Negative for abdominal pain, constipation, diarrhea, nausea and vomiting.   Genitourinary: Negative for decreased urine volume, dysuria, frequency and urgency.   Musculoskeletal: Negative for back pain, joint swelling and neck pain.   Skin: Negative for color change, rash and wound.   Neurological: Negative for dizziness, weakness, light-headedness and headaches.     Objective:     Vital Signs (Most Recent):  Temp: 96.1 °F (35.6 °C) (05/01/17 0321)  Pulse: 79 (05/01/17 0800)  Resp: (!) 24 (05/01/17 0731)  BP: 136/73 (05/01/17 0321)  SpO2: (!) 92 % (05/01/17 0800) Vital Signs (24h Range):  Temp:  [96.1 °F (35.6 °C)-98.1 °F (36.7 °C)] 96.1 °F (35.6 °C)  Pulse:  [53-79] 79  Resp:  [19-24] 24  SpO2:  [85 %-98 %] 92 %  BP: (113-136)/(61-73) 136/73     Weight: 86.2 kg (190 lb)  Body mass index is 25.77 kg/(m^2).    Intake/Output Summary (Last 24 hours) at 05/01/17 0854  Last data filed at 05/01/17 0600   Gross per 24 hour   Intake             1690 ml   Output             1350 ml   Net              340 ml      Physical Exam   Constitutional: He is oriented to person, place, and time. He appears well-developed and well-nourished. No distress.   HENT:   Head: Normocephalic and atraumatic.   Right Ear: External ear normal.   Left Ear: External ear normal.   Eyes: Conjunctivae and EOM are normal. Pupils are equal, round, and reactive to light. Right eye exhibits no discharge. Left eye exhibits no discharge.    Neck: Neck supple. No tracheal deviation present.   Cardiovascular: Normal rate and regular rhythm.  Exam reveals no gallop and no friction rub.    No murmur heard.  Pulmonary/Chest: Effort normal. No respiratory distress. He has wheezes. He has rales (worse on the right lung base, some improvement from admission however).   Abdominal: Soft. Bowel sounds are normal. He exhibits no distension. There is no tenderness.   Neurological: He is alert and oriented to person, place, and time. No cranial nerve deficit.   Skin: Skin is warm and dry.   Psychiatric: He has a normal mood and affect. His behavior is normal.   Nursing note and vitals reviewed.      Significant Labs:   CBC:     Recent Labs  Lab 04/30/17  0700 05/01/17  0518   WBC 33.28* 34.45*   HGB 11.0* 11.3*   HCT 30.4* 31.2*   * 475*     CMP:     Recent Labs  Lab 04/30/17  0659 05/01/17  0518   * 123*   K 5.1 4.6   CL 91* 92*   CO2 22* 22*    117*   BUN 22 26*   CREATININE 1.0 0.9   CALCIUM 8.6* 8.4*   PROT 6.1 6.0   ALBUMIN 2.6* 2.6*   BILITOT 0.6 0.6   ALKPHOS 121 128   AST 74* 43*   * 118*   ANIONGAP 12 9   EGFRNONAA >60 >60     All pertinent labs within the past 24 hours have been reviewed.    Significant Imaging:  CXR 4/29  Persistent right lung interstitial and airspace disease with mildly improved aeration right upper lobe.  Small bilateral pleural effusions    CTA  No evidence of pulmonary embolus or acute aortic syndrome.    Bibasal pneumonia and pleural fluid right worse than left.    Head CT Findings consistent with age-related microvascular ischemic changes of the bilateral cerebral white matter.

## 2017-05-01 NOTE — PLAN OF CARE
Problem: Patient Care Overview  Goal: Plan of Care Review  Outcome: Ongoing (interventions implemented as appropriate)  Pt wore BiPAP majority of shift; 13/5/45% No distress noted; resting comfortably.

## 2017-05-01 NOTE — ASSESSMENT & PLAN NOTE
Based on CT findings  Started steroids to see if improves sx and oxygenation  WBC will no longer be helpful in guiding antibx although it is continuing to elevate significantly  although his steroids  Are weaning  Dr. Tate consulted and bipap started over night. He is now dependent. Can not take off even to eat without dropping sats to 65%. This is new  Transferring for pulm consultation.

## 2017-05-01 NOTE — ASSESSMENT & PLAN NOTE
Could be related to the lung process. Has increased from admission at the OSH. Will continue to monitor.

## 2017-05-01 NOTE — PHYSICIAN QUERY
"PT Name: Sandeep Wolfe  MR #: [id number]    Physician Query Form - Heart  Condition Clarification     CDS/: Roxy Jaimes               Contact information:  This form is a permanent document in the medical record.     Query Date: May 1, 2017    By submitting this query, we are merely seeking further clarification of documentation. Please utilize your independent clinical judgment when addressing the question(s) below.    The medical record contains the following   Indicators     Supporting Clinical Findings Location in Medical Record   x     BNP was slightly elevated to the 500's Labs 05/01    Critical Care Medicine Consult Note    EF      Radiology findings     x Echo Results 2-D echo showed diastolic dysfunction Critical Care Medicine Consult Note    "Ascites" documented      "SOB" or "WOOD" documented      "Hypoxia" documented     x Heart Failure documented HFpEF   -Needs some diuresis  Critical Care Medicine Consult Note    "Edema" documented     x Diuretics/Meds Lasix 40 mg IVP tid MAR    Treatment:      Other:          Provider, please specify diagnosis or diagnoses associated with above clinical findings.                               [  ] Acute on Chronic Diastolic Heart Failure( EF > 40)*  [  ] Chronic Diastolic Heart Failure (EF > 40)*  [ x ] Other Type of Heart Failure (please specify type): _____acute diastolic heart failure____________________  [  ] Clinically Undetermined            *American Heart Association                                                                                                          Please document in your progress notes daily for the duration of treatment until resolved and include in your discharge summary.    "

## 2017-05-01 NOTE — CONSULTS
[facility]  Critical Care Medicine  Consult Note    Patient Name: Sandeep Wolfe  MRN: [id number]  Admission Date: 5/1/2017  Hospital Length of Stay: 0 days  Code Status: Full Code  Primary Care Provider: Juan José Berumen MD   Principal Problem: Acute respiratory failure with hypoxia    Consults  Subjective:   This is an 86 year-old male with a PMH of HTN, BPH, dyslipidemia who was relatively healthy up until 1 week ago, presented to an outside facility on 4/27 for weakness and SOB X 1 week. Pt reports that before this illness he used to walk about 2 miles per day. He quit smoking 30 years ago. The pt was seen in an urgent care clinic the week of presentation for URI type symptoms. He was given a z-pack and sent home. Upon admit to the other facility, he was noted to be hypoxemic to the 80's and also has a low Na. Noted to have opacity on the right hemithorax on CXR attributed to PNA.BNP was slightly elevated to the 500's. 2-D echo showed diastolic dysfunction. Admitted for above reasons. Was initially on rocephin and azithormycin but over the past few days abs increased to meropenem and vanc. CTA done showed no PE. There were some subpleural cysts that was read as possible UIP pattern. He was also started on steroids at the other facility. Over the past 2 days, he has had worsening hypoxemia and required NPPV. Was transferred here for pulm eval.    Past Medical History:   Diagnosis Date    BPH (benign prostatic hyperplasia)     Cataract     Hyperlipidemia     Hypertension     Kidney stone        Past Surgical History:   Procedure Laterality Date    APPENDECTOMY      LASER OF PROSTATE W/ GREEN LIGHT PVP      TONSILLECTOMY         Review of patient's allergies indicates:   Allergen Reactions    Livalo [pitavastatin] Other (See Comments)     Muscle cramps    Tribenzor [olmesartan-amlodipin-hcthiazid] Other (See Comments)     Muscle cramps       Family History     Problem Relation (Age of  Onset)    Cancer Father    Stroke Mother          Social History Main Topics    Smoking status: Former Smoker     Types: Cigarettes     Quit date: 1/1/1980    Smokeless tobacco: Never Used    Alcohol use 8.4 oz/week     14 Shots of liquor per week      Comment: couple of shots every night    Drug use: No    Sexual activity: Not on file        Review of Systems   Constitutional: Positive for activity change, appetite change and fatigue. Negative for chills, diaphoresis, fever and unexpected weight change.   HENT: Positive for congestion and postnasal drip. Negative for hearing loss, sneezing, sore throat and trouble swallowing.    Respiratory: Positive for cough and shortness of breath. Negative for choking, chest tightness and wheezing.    Cardiovascular: Negative for chest pain, palpitations and leg swelling.   Gastrointestinal: Positive for constipation. Negative for abdominal distention, blood in stool and diarrhea.   Genitourinary: Negative for difficulty urinating and dysuria.   Neurological: Negative for dizziness.   Psychiatric/Behavioral: Negative for agitation.     Objective:     Vital Signs (Most Recent):  Pulse: 60 (05/01/17 1420)  Resp: (!) 24 (05/01/17 1420)  BP: 136/70 (05/01/17 1420)  SpO2: (!) 92 % (05/01/17 1420) Vital Signs (24h Range):  Temp:  [96.1 °F (35.6 °C)-98.1 °F (36.7 °C)] 96.1 °F (35.6 °C)  Pulse:  [53-79] 60  Resp:  [19-30] 24  SpO2:  [65 %-98 %] 92 %  BP: (126-146)/(67-74) 136/70     Weight: 85.2 kg (187 lb 13.3 oz)  Body mass index is 25.47 kg/(m^2).    No intake or output data in the 24 hours ending 05/01/17 1435    Physical Exam   Constitutional: He is oriented to person, place, and time. He appears well-developed and well-nourished.   On BIPAP   HENT:   Head: Normocephalic.   Eyes: EOM are normal. Pupils are equal, round, and reactive to light.   Neck: Normal range of motion.   Cardiovascular: Normal rate.    Pulmonary/Chest: Effort normal.   Musculoskeletal: Normal range of  motion. He exhibits no edema.   Neurological: He is alert and oriented to person, place, and time.   Skin: Skin is warm and dry.       Vents:  Oxygen Concentration (%): 80 (05/01/17 1420)    Lines/Drains/Airways     Peripheral Intravenous Line                 Peripheral IV - Single Lumen 04/26/17 2113 Left Forearm 4 days                Significant Labs:    CBC/Anemia Profile:    Recent Labs  Lab 04/30/17  0700 05/01/17  0518   WBC 33.28* 34.45*   HGB 11.0* 11.3*   HCT 30.4* 31.2*   * 475*   MCV 88 88   RDW 11.7 11.9        Chemistries:    Recent Labs  Lab 04/30/17  0659 05/01/17  0518   * 123*   K 5.1 4.6   CL 91* 92*   CO2 22* 22*   BUN 22 26*   CREATININE 1.0 0.9   CALCIUM 8.6* 8.4*   ALBUMIN 2.6* 2.6*   PROT 6.1 6.0   BILITOT 0.6 0.6   ALKPHOS 121 128   * 118*   AST 74* 43*           Assessment/Plan:   1. Acute hypoxemic resp failure  2. HFpEF  3. HTN  4. CAP/HAP  5. Hyponatremia  6. Leukocytosis      Neurology:  -AAO3, NAD, Febrile    Cardiovascular:  Hemodynamics  -Stable    Rhythm  -NSR    HFpEF  -Needs some diuresis    Respiratory:  -Acute hypoxemic resp failure  -Check ABG  -Cont BiPAP at current settings. Likely pulm edema from HFpEF-NPPV should help  -Start lasix 40 mg IV TID  -Please also cover for HAP with broad spectrum abs. Would repeat all cultures here.   -HFNCO2 when off BiPAP    -? UIP patter  -Not typical UIP pattern on CT. Lots of GGO that is not consistent with UIP  -Would wean steroids over the next week. Start prednisone 40 mg oral today.  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 Was placed on steroids at the other facility.  -BUN and Cr are both stable    GI:  -Pt has not had a BM in several days   -Start some oral laxatives.     ID:  -Pan culture and start Abs for HAP    Hematology  -H/H stable  -WBC count climbing. May be steroid related. On  abs  -Platelet counts stable    Endo:  -Checking TSH    Prevention:  -DVT prophylaxis  -Keep head of bed elevated      Code status FULL      Nadine Mcallister MD  Critical Care Medicine  [facility]

## 2017-05-01 NOTE — PROGRESS NOTES
Sandeep Wolfe is a 86 y.o. male patient.    Active Hospital Problems    Diagnosis  POA    *Pneumonia of right lung due to infectious organism [J18.9]  Yes    IPF (idiopathic pulmonary fibrosis) [J84.112]  Yes    Acute diastolic heart failure [I50.31]  Yes    Essential hypertension [I10]  Yes    Legionella pneumonia [A48.1]  Yes    Hyponatremia [E87.1]  Yes      Resolved Hospital Problems    Diagnosis Date Resolved POA   No resolved problems to display.     Temp: 96.1 °F (35.6 °C) (05/01/17 0321)  Pulse: (!) 57 (05/01/17 0600)  Resp: (!) 22 (05/01/17 0321)  BP: 136/73 (05/01/17 0321)  SpO2: (!) 92 % (05/01/17 0321)  Weight: 86.2 kg (190 lb) (04/26/17 1621)  Height: 6' (182.9 cm) (04/26/17 1621)    Subjective  Objective  Assessment:  (Right Pneumonia   Right pleural effusion   Acute Respiratory Failure  COPD).     Plan:   Tolerating bipap   Looks better today.       Michele Tate MD  5/1/2017

## 2017-05-01 NOTE — TREATMENT PLAN
Pt sats dropped to the 70's on HFNCO2. Pt placed back on NiPPV. Spoke to nurse and the pts daughter to ask pt to keep BiPAP on. Ordered lasix 60 IV stat. If he fails to oxygenate despite these measures, I discussed endotracheal intubation and mechanical ventilation which is acceptable to the pt and family. Will closely monitor.    Nadine Mcallister MD  PCCM Fellow

## 2017-05-01 NOTE — PROGRESS NOTES
Dr. Green notified of respiratory distress. Patient pulse ox 65% on 5-6 liters nasal cannula. Abdominal muscle use. Complains of shortness of breath. Patient place back on Bipap with pulse ox 88-90%.

## 2017-05-01 NOTE — PLAN OF CARE
Problem: Health Knowledge, Opportunity to Enhance (Adult,NICU,[place],Obstetrics,Pediatric)  Goal: Knowledgeable about Health Subject/Topic  Patient will demonstrate the desired outcomes by discharge/transition of care.  Outcome: Outcome(s) achieved Date Met:  17  Quiet evening. Tolerating BIPAP fairly well with intermittent tachypnea and dyspnea on exertion. Saline lock intact and tolerating IVPB antibiotics well. Up on bedside to urinate with dyspnea. Slept well after Ativan PO administration. DR Andino consulted and New antibiotic Azithromycin ordered and administered and tolerated well. CLAIR Perdomo RN

## 2017-05-01 NOTE — CONSULTS
Sandeep Wolfe is a 86 y.o. year old male that's presents with a chief complaint of SOB and Wheezing for 7 days.Pt without Chest Pain + cough o2 sat 78 was on 6 liters changed to bipap.Improved saturation . Consulted to evaluate Respiratory status.    Past Medical History:   Diagnosis Date    BPH (benign prostatic hyperplasia)     Cataract     Hyperlipidemia     Hypertension     Kidney stone         Past Surgical History:   Procedure Laterality Date    APPENDECTOMY      LASER OF PROSTATE W/ GREEN LIGHT PVP      TONSILLECTOMY         Prior to Admission medications    Medication Sig Start Date End Date Taking? Authorizing Provider   amlodipine (NORVASC) 10 MG tablet TAKE 1 TABLET DAILY 1/2/17  Yes Juan José Berumen MD   calcium-vitamin D 500-125 mg-unit tablet Take 1 tablet by mouth 2 (two) times daily.     Yes Historical Provider, MD   fish oil-omega-3 fatty acids 300-1,000 mg capsule Take 2 g by mouth once daily.     Yes Historical Provider, MD   glucosamine-chondroitin 500-400 mg tablet Take 1 tablet by mouth 3 (three) times daily.     Yes Historical Provider, MD   losartan (COZAAR) 100 MG tablet TAKE 1 TABLET DAILY 1/2/17  Yes Juan José Berumen MD   metoprolol tartrate (LOPRESSOR) 25 MG tablet Take 1 tablet (25 mg total) by mouth 2 (two) times daily. 1/18/16  Yes Juan José Berumen MD   multivitamin with minerals tablet Take 1 tablet by mouth once daily.     Yes Historical Provider, MD   naproxen (NAPROSYN) 500 MG tablet Take 1 tablet (500 mg total) by mouth 2 (two) times daily as needed. 4/11/17   Juan José Berumen MD       Social History     Social History    Marital status:      Spouse name: N/A    Number of children: N/A    Years of education: N/A     Occupational History    Not on file.     Social History Main Topics    Smoking status: Former Smoker     Types: Cigarettes     Quit date: 1/1/1980    Smokeless tobacco: Never Used    Alcohol use 8.4 oz/week     14 Shots of liquor  per week      Comment: couple of shots every night    Drug use: No    Sexual activity: Not on file     Other Topics Concern    Not on file     Social History Narrative       Family History   Problem Relation Age of Onset    Stroke Mother     Cancer Father        Review of patient's allergies indicates:   Allergen Reactions    Livalo [pitavastatin] Other (See Comments)     Muscle cramps    Tribenzor [olmesartan-amlodipin-hcthiazid] Other (See Comments)     Muscle cramps    Allergies have been reviewed.     ROS:  negative N/V, positive Fever, negative Syncope, negative Unilateral weakness, positive Chest pain, negative Rash, positive Sore throat, positive   Lower extremity swelling, positiveAbdominal pain, negativeDysuria, negativePolyuria, negativeEasy Bruising,  negativeWeight Loss, negativeNasal Drainage,otherwise ROS Negative    PE:   Vitals:    04/30/17 1800 04/30/17 1908 04/30/17 1948 04/30/17 1954   BP:   128/67    BP Location:   Right arm    Patient Position:   Lying    BP Method:   Automatic    Pulse: 71 67 72    Resp:  (!) 22 (!) 22    Temp:   98.1 °F (36.7 °C)    TempSrc:   Oral    SpO2:  97% 98% 97%   Weight:       Height:           Alert and orientated X 3   HEENT: Head: Normocephalic no trauma                Ears : Normal Pinna No Drainage no Battles sign                Eyes: Vision Unchanged, No conjunctivitis,No drainage                Neck: Supple, No JVD,No Abnormal Carotid Pulsations                Throat: No Erythema, No pus,No Swelling,Mallampati score=    Chest: Course breath sounds Bilaterally with wheezing and crakles right base   Cardiac: RRR S1+ S2 with a -S3: +M = 2/6, No R/H/G  Abdomen: Bowel Sounds are Normal.Soft Abdomen. No organomegaly of Liver,Spleen,or Kidneys   CNS: Non focal and intact. Cranial nerves 2, 346,8,9,10 and 12 are normal.Norrmal gait.Normal posture.  Extremities: No Clubbing,No Cyanosis with oxygen,Positive mild edema of lower extremities Bilateral  Skin: No Rash,  No Ulcerative sores,and No cellulitis of the IV site.    Lab Results   Component Value Date    WBC 33.28 (H) 04/30/2017    HGB 11.0 (L) 04/30/2017    HCT 30.4 (L) 04/30/2017     (H) 04/30/2017    CHOL 261 (H) 04/12/2017    TRIG 129 04/12/2017    HDL 46 04/12/2017     (H) 04/30/2017    AST 74 (H) 04/30/2017     (L) 04/30/2017    K 5.1 04/30/2017    CL 91 (L) 04/30/2017    CREATININE 1.0 04/30/2017    BUN 22 04/30/2017    CO2 22 (L) 04/30/2017    TSH 3.613 04/12/2017    PSA 5.3 (H) 04/12/2017    INR 1.1 04/26/2017       Assessment:  1. Right Lower Lobe pneumonia   2. Right pleural effusion   3. Acute hypoxic Respiratory Failure   4. COPD    ICD-10-CM ICD-9-CM   1. Hyponatremia E87.1 276.1   2. Leg edema R60.0 782.3   3. Abdominal pain R10.9 789.00   4. Pneumonia of right lung due to infectious organism, unspecified part of lung J18.9 483.8   5. CHF (congestive heart failure) I50.9 428.0   6. Pneumonia of right lung due to infectious organism J18.9 483.8   7. Pneumonia, organism unspecified J18.9 486   8. SOB (shortness of breath) R06.02 786.05   9. Elevated d-dimer R79.89 790.92         Plan:  1. Follow cxr   2. Bipap 13/5 rate 18  3. Neb treatments   4. antiinflamitories

## 2017-05-01 NOTE — ASSESSMENT & PLAN NOTE
Started rocephin and azithromycin, day 2; change a/b to Vanc and Primaxin 4/28/17 (by dr. Berumen) as WBC rising  levalbuterol q 6 hours WA  BC x 2; no growth to date  Sputum cx not colelcted, no specimen  Follow CBC, trending up today due to steroids  No fevers    Cont on vanc, primaxin, and azithromycin

## 2017-05-01 NOTE — NURSING
Report received and assessment completed. O2 sats on N/C at 6l/min in high 70s and low 80s. Resp. Tech. Notified and BIPAP stated with sats in 90s. And tolerating BIPAP fairly well. Saline lock in intact. HOB elevated. Call bell in reach and use. Plan of care discussed and verbalizes understanding. Congested cough with diminished breath sounds right side. CLAIR Perdomo RN

## 2017-05-01 NOTE — PROGRESS NOTES
[facility] Hospital Medicine  Progress Note    Patient Name: Sandeep Wolfe  MRN: [id number]  Patient Class: IP- Inpatient   Admission Date: 4/26/2017  Length of Stay: 4 days  Attending Physician: Juan José Berumen MD  Primary Care Provider: Juan José Berumen MD        Subjective:     Principal Problem:Pneumonia of right lung due to infectious organism    HPI:  Patient is 86 year old male that presents to ED with 2 week history of weakness and cough. Been in bed most of this time.  Went to urgent care day Sunday. Was diagnosed with URI and was given zithromax and shot of steroids. Felt better for a couple of days, but yesterday weakness and SOB returned so he came to ED. Work up in ED is positive for left sided pneumonia, significant hyponatremia and heart failure(pt has no history). Given one dose of lasix in ED and placed on floor for IV abx and continue home BB, ARB.     Hospital Course:  Since admission he has been diuresing and sodium is up to 122 from 116 this am and SOB improved. Very stoic fella. No complaints this am.     D-dimer noted to be elevated yesterday, CTA chest done. Right lung with mod multilobar infiltrates. Both lungs with mild honeycombing cysts and reticular septal thickening suggestive of Usual interstitial pneumonia ie clinnical idiopathic pulmonary fibrosis versus other interstitial lung disease. Both lungs with multifocal mild scattered centrilobular groundglass lung opacities. Differential is broad including active interstitial lung disease, hypersensitivity pneumonitis, interstitial edema, opportunistic/atypical infection, bronchopneumonia, viral infections, and hemorrhage/vascilitis. Moderate right pleural effusion. Small left pleural effusion. Small sized gastric hiatal hernia.     WBC zabrina overnight, Dr. Berumen changed a/b from Rocephin and Azithromycin (s/p 2 days) to Vanc and Primaxin    4/28/17.   Reports breathing better this am but his O2 requirements  have increased to 4L.   HypoNa improving. Dr. alcantara came by this AM and stopped fluids and gave a dose of lasix already.     4/29/17  He feels better this AM. Breathing is less labored, still with a cough that is not productive. Remains on 4L NC with sats 90%. Eating better today. WBC 20K s/p 126mg solumedrol yesterday, weaning down steroids today but no fevers. Na remains 122.     4/30  Subjectively feel SOB is better however on 6L NC this AM with sats low 90s. WBC 33K, normal granulocytes and no fevers. CXR yesterday a bit better upper lobe areation on the right.     5/1  He is now needing bipap since over night to keep oxygenation. Pox 92% on 50% FiO2 BiPaP 12/5 (set by Dr. Tate). RR 22. Afebrile. He is on vanc, azithromycin and primaxin. Attempted to take off bipap this am for breakfast and sats dropped to 65%. WBC also up to [address]. He is on steroids 80mg IV every 8hr (day 3). Despite his worsening hypoxia and remains comfortable, not using accessory muscles.     His sodium has stabilized 123. No IVF or lasix at this time.     Unsure what has changed in last 24 hours to change his course. He looks like he is not turning the corner although we are 5 days in with abx, nebs, etc. Family at bedside this am. Discussed case and will need transfer for higher level of care (ID/pulm). I am considering an atypical infection; ? Fungal superimposed on IPF. He remains a full code.       Interval History: worsening SOB, hypoxic off BiPaP this AM which is a new finding. He appears comfortable despite his low O2 sats and wants to eat breakfast.     Review of Systems   Constitutional: Positive for appetite change (prio to admission, eating better since admission). Negative for activity change, fatigue, fever and unexpected weight change.   HENT: Negative for congestion, ear pain, hearing loss, rhinorrhea and sore throat.    Eyes: Negative for pain, redness and visual disturbance.   Respiratory: Positive for cough and  shortness of breath. Negative for wheezing.    Cardiovascular: Negative for chest pain, palpitations and leg swelling.   Gastrointestinal: Negative for abdominal pain, constipation, diarrhea, nausea and vomiting.   Genitourinary: Negative for decreased urine volume, dysuria, frequency and urgency.   Musculoskeletal: Negative for back pain, joint swelling and neck pain.   Skin: Negative for color change, rash and wound.   Neurological: Negative for dizziness, tremors, weakness, light-headedness and headaches.     Objective:     Vital Signs (Most Recent):  Temp: 96.1 °F (35.6 °C) (05/01/17 0321)  Pulse: 66 (05/01/17 1000)  Resp: (!) 30 (05/01/17 0815)  BP: (!) 146/74 (05/01/17 0815)  SpO2: (!) 90 % (05/01/17 0830) Vital Signs (24h Range):  Temp:  [96.1 °F (35.6 °C)-98.1 °F (36.7 °C)] 96.1 °F (35.6 °C)  Pulse:  [53-79] 66  Resp:  [19-30] 30  SpO2:  [65 %-98 %] 90 %  BP: (126-146)/(67-74) 146/74     Weight: 86.2 kg (190 lb)  Body mass index is 25.77 kg/(m^2).    Intake/Output Summary (Last 24 hours) at 05/01/17 1206  Last data filed at 05/01/17 0600   Gross per 24 hour   Intake             1300 ml   Output              750 ml   Net              550 ml      Physical Exam   Constitutional: He is oriented to person, place, and time. He appears well-developed and well-nourished. No distress.   HENT:   Head: Normocephalic and atraumatic.   Right Ear: External ear normal.   Left Ear: External ear normal.   Eyes: Conjunctivae and EOM are normal. Pupils are equal, round, and reactive to light. Right eye exhibits no discharge. Left eye exhibits no discharge.   Neck: Neck supple. No tracheal deviation present.   Cardiovascular: Normal rate and regular rhythm.  Exam reveals no gallop and no friction rub.    No murmur heard.  Pulmonary/Chest: Effort normal. No respiratory distress. He has wheezes. He has rales. Tenderness: R>L.   Abdominal: Soft. Bowel sounds are normal. He exhibits no distension. There is no tenderness.    Musculoskeletal: He exhibits no edema.   Neurological: He is alert and oriented to person, place, and time. No cranial nerve deficit.   Skin: Skin is warm and dry.   Psychiatric: He has a normal mood and affect. His behavior is normal.   Nursing note and vitals reviewed.      Significant Labs:   CBC:   Recent Labs  Lab 04/30/17  0700 05/01/17  0518   WBC 33.28* 34.45*   HGB 11.0* 11.3*   HCT 30.4* 31.2*   * 475*     CMP:   Recent Labs  Lab 04/30/17  0659 05/01/17  0518   * 123*   K 5.1 4.6   CL 91* 92*   CO2 22* 22*    117*   BUN 22 26*   CREATININE 1.0 0.9   CALCIUM 8.6* 8.4*   PROT 6.1 6.0   ALBUMIN 2.6* 2.6*   BILITOT 0.6 0.6   ALKPHOS 121 128   AST 74* 43*   * 118*   ANIONGAP 12 9   EGFRNONAA >60 >60     Cardiac Markers: No results for input(s): CKMB, MYOGLOBIN, BNP, TROPISTAT in the last 48 hours.  Troponin: No results for input(s): TROPONINI in the last 48 hours.  All pertinent labs within the past 24 hours have been reviewed.    Significant Imaging: I have reviewed all pertinent imaging results/findings within the past 24 hours.    Assessment/Plan:      * Pneumonia of right lung due to infectious organism  Started rocephin and azithromycin, for 2 days; change a/b to Vanc and Primaxin 4/28/17 (by dr. Berumen) as WBC rising  levalbuterol q 6 hours WA  BC x 2; no growth to date  Sputum cx not colelcted, no specimen  Follow CBC, trending up, originally thought due to steroids but the trend continues to rise significant > 30K. No fevers.     Cont on vanc, primaxin, and azithromycin. Transferring for infectious disease consultation. ?Atypical ?fungal  Never could obtain sputum cx. Would bronch be helpful? Transfer for pulm consultation    Hyponatremia  Check a urine sodium, urine creatinine, urine osmolality and serum osmolality to facilitate hypoNa workup    Hypotonic hyponatremia, appears euvolemic with urine Na >20. I think SIADH. Tried IVF and seems to have more SOB. TTE does show  diastolic dysfunction  Got 20mg IV lasix 4/28    Na up to 123 today, asymptomatic.    Acute diastolic heart failure  Elevated BNP on admission; this is new for pt  Consult cards  Echo with diastolic dysfunction with 60% EF  DC IVF, x 1 Lasix 20 mg 4/28/17  Noted effusions  We have not been actively diuresis as effusions seems chronic and was getting better until this AM. Diuresis may be helpful as well. Transferred prior to receiving any further diuresis today      Essential hypertension  Amlodipine, ARB, BB  Well controlled overall      Legionella pneumonia  There was suspicionon admission for this due to infection plus hypoNa by admitting doctor    Was covered with macrolid on admission (2 days) then broadened coverage as WBC trending up (4/28) and hypoxia worsening (bipap now)    I think this is less likely at this point but a consideration. I think this may be atypical organism superimposed on IPF. unable to get sputum sample and consitioning not improving as expected on broad coverage      IPF (idiopathic pulmonary fibrosis)  Based on CT findings  Started steroids to see if improves sx and oxygenation  WBC will no longer be helpful in guiding antibx although it is continuing to elevate significantly  although his steroids  Are weaning  Dr. Tate consulted and bipap started over night. He is now dependent. Can not take off even to eat without dropping sats to 65%. This is new  Transferring for pulm consultation.       Acute respiratory failure with hypoxia  started bipap, now sats drop to 65 without. His white count continues to climb although he is on vanc, zithromax and promaxin. He remains afebrile. He will be transferred to [facility] to an ICU bed as we need a higher level of care. I/D and pulmonology      VTE Risk Mitigation         Ordered     enoxaparin injection 40 mg  Daily     Route:  Subcutaneous        04/27/17 1857     Medium Risk of VTE  Once      04/26/17 2022     Place BRIAN hose  Until  discontinued      04/26/17 2022          Amie Green MD  Department of Hospital Medicine   [facility]

## 2017-05-01 NOTE — ASSESSMENT & PLAN NOTE
Check a urine sodium, urine creatinine, urine osmolality and serum osmolality to facilitate hypoNa workup    Hypotonic hyponatremia, appears euvolemic with urine Na >20. I think SIADH. Tried IVF and seems to have more SOB. TTE does show diastolic dysfunction  Got 20mg IV lasix 4/28    Na up to 123 today, asymptomatic.

## 2017-05-01 NOTE — ASSESSMENT & PLAN NOTE
There was suspicionon admission for this due to infection plus hypoNa by admitting doctor    Was covered with macrolid on admission (2 days) then broadened coverage as WBC trending up (4/28) and hypoxia worsening (bipap now)    I think this is less likely at this point but a consideration. I think this may be atypical organism superimposed on IPF. unable to get sputum sample and consitioning not improving as expected on broad coverage

## 2017-05-01 NOTE — PROGRESS NOTES
Patient discharged with [facility]. Bipap use en route. IV patent. Family at side.

## 2017-05-01 NOTE — ASSESSMENT & PLAN NOTE
Diastolic dysfunction.   Continue home metoprolol 25 mg BID and amlodipine 10 mg daily. Giving losartan at 25 mg daily which is down from 100 mg daily home dose. Had echo done 4/27/17 that showed normal LVEF with diastolic dysfunction and PASP of 35.

## 2017-05-01 NOTE — PLAN OF CARE
05/01/17 1250   Final Note   Assessment Type Final Discharge Note   Discharge Disposition Discharged   Right Care Referral Info   Facility Name [facility]

## 2017-05-01 NOTE — ASSESSMENT & PLAN NOTE
Check a urine sodium, urine creatinine, urine osmolality and serum osmolality to facilitate hypoNa workup    Hypotonic hyponatremia, appears euvolemic with urine Na >20. I think SIADH. Tried IVF and seems to have more SOB. TTE does show diastolic dysfunction  Got 20mg IV lasix 4/28    Na up to 123 today, asymptomatic. Hold on further fluids and diuretics and see if will start correctly on its own. OK for PO intake as he is doing

## 2017-05-01 NOTE — PROGRESS NOTES
[facility]  Cardiology  Progress Note    Patient Name: Sandeep Wolfe  MRN: [id number]  Admission Date: 4/26/2017  Hospital Length of Stay: 4 days  Code Status: Full Code   Attending Physician: Juan José Berumen MD   Primary Care Physician: Juan José Berumen MD  Expected Discharge Date:   Principal Problem:Pneumonia of right lung due to infectious organism    Subjective:     CC: SOB       Interval History: 86 year old w/m with HX of dyslipidemia, BPH, HTN presents to ED after being seen at urgent care for URI symptoms that have been lasting approximatley 10 days. He reports increased generalized weakness and abdominal bloating. He denies cough or significant SOB. He was diagnosed with hyponatremia and Rt sided pneumonia. Patient reports frequent coughing non productive throughout the night. CTA of chest revealed no evidence of PE.    ROS   Constitutional: generalized weakness  Eyes: Negative   Respiratory: cough   Cardiovascular: Negative   Gastrointestinal: abdominal bloating   Genitourinary: Negative   Musculoskeletal: Negative   Skin: Negative .   Neurological: Negative     Objective:     Vital Signs (Most Recent):  Temp: 96.1 °F (35.6 °C) (05/01/17 0321)  Pulse: 68 (05/01/17 0815)  Resp: (!) 30 (05/01/17 0815)  BP: (!) 146/74 (05/01/17 0815)  SpO2: (!) 90 % (05/01/17 0830) Vital Signs (24h Range):  Temp:  [96.1 °F (35.6 °C)-98.1 °F (36.7 °C)] 96.1 °F (35.6 °C)  Pulse:  [53-79] 68  Resp:  [19-30] 30  SpO2:  [65 %-98 %] 90 %  BP: (113-146)/(61-74) 146/74     Weight: 86.2 kg (190 lb)  Body mass index is 25.77 kg/(m^2).    SpO2: (!) 90 %  O2 Device (Oxygen Therapy): BiPAP      Intake/Output Summary (Last 24 hours) at 05/01/17 1005  Last data filed at 05/01/17 0600   Gross per 24 hour   Intake             1300 ml   Output              750 ml   Net              550 ml       Lines/Drains/Airways     Peripheral Intravenous Line                 Peripheral IV - Single Lumen 04/26/17 2113 Left Forearm  4 days                Physical Exam   General appearance: alert, appears stated age and cooperative  Head: Normocephalic, without obvious abnormality, atraumatic  Eyes: conjunctivae/corneas clear. PERRL  Neck: no carotid bruit, no JVD and supple, symmetrical, trachea midline  Lungs: wheezing bilaterally, labored breathing, on BiPap  Chest Wall: no tenderness  Heart: regular rate and rhythm, S1, S2 normal, no murmur, click, rub or gallop  Abdomen: soft, non-tender; bowel sounds normal; no masses, no organomegaly  Extremities: Extremities normal, atraumatic, no cyanosis, clubbing, or edema  Pulses: Dorsalis Pedis R: 2+ (normal)/L: 2+ (normal)  Skin: Skin color, texture, turgor normal. No rashes or lesions  Neurologic: generalized weakness  Alert and oriented X 3    Significant Labs:   BMP:   Recent Labs  Lab 04/30/17  0659 05/01/17  0518    117*   * 123*   K 5.1 4.6   CL 91* 92*   CO2 22* 22*   BUN 22 26*   CREATININE 1.0 0.9   CALCIUM 8.6* 8.4*   , CMP   Recent Labs  Lab 04/30/17  0659 05/01/17  0518   * 123*   K 5.1 4.6   CL 91* 92*   CO2 22* 22*    117*   BUN 22 26*   CREATININE 1.0 0.9   CALCIUM 8.6* 8.4*   PROT 6.1 6.0   ALBUMIN 2.6* 2.6*   BILITOT 0.6 0.6   ALKPHOS 121 128   AST 74* 43*   * 118*   ANIONGAP 12 9   ESTGFRAFRICA >60 >60   EGFRNONAA >60 >60   , CBC   Recent Labs  Lab 04/30/17  0700 05/01/17  0518   WBC 33.28* 34.45*   HGB 11.0* 11.3*   HCT 30.4* 31.2*   * 475*   , INR No results for input(s): INR, PROTIME in the last 48 hours., Lipid Panel No results for input(s): CHOL, HDL, LDLCALC, TRIG, CHOLHDL in the last 48 hours., Troponin No results for input(s): TROPONINI in the last 48 hours., All pertinent lab results from the last 24 hours have been reviewed. and   Recent Lab Results       05/01/17  0518      Albumin 2.6(L)     Alkaline Phosphatase 128     (H)     Anion Gap 9     Aniso Slight     AST 43(H)     BANDS 2.0     Baso # CANCELED  Comment:  Result  canceled by the ancillary     Basophil% 0.0     Total Bilirubin 0.6  Comment:  For infants and newborns, interpretation of results should be based  on gestational age, weight and in agreement with clinical  observations.  Premature Infant recommended reference ranges:  Up to 24 hours.............<8.0 mg/dL  Up to 48 hours............<12.0 mg/dL  3-5 days..................<15.0 mg/dL  6-29 days.................<15.0 mg/dL       BUN, Bld 26(H)     Calcium 8.4(L)     Chloride 92(L)     CO2 22(L)     Creatinine 0.9     Differential Method Manual     eGFR if  >60     eGFR if non  >60  Comment:  Calculation used to obtain the estimated glomerular filtration  rate (eGFR) is the CKD-EPI equation. Since race is unknown   in our information system, the eGFR values for   -American and Non--American patients are given   for each creatinine result.       Eos # CANCELED  Comment:  Result canceled by the ancillary     Eosinophil% 0.0     Glucose 117(H)     Gran% 86.0(H)     Hematocrit 31.2(L)     Hemoglobin 11.3(L)     Lymph # CANCELED  Comment:  Result canceled by the ancillary     Lymph% 6.0(L)     MCH 32.0(H)     MCHC 36.2(H)     MCV 88     Mono # CANCELED  Comment:  Result canceled by the ancillary     Mono% 6.0     MPV 8.4(L)     Ovalocytes Occasional     Platelet Estimate Decreased(A)     Platelets 475(H)     Potassium 4.6     Total Protein 6.0     RBC 3.53(L)     RDW 11.9     Sodium 123(L)     Stomatocytes Present     WBC 34.45(H)           Significant Imaging: CT scan: CT ABDOMEN PELVIS WITH CONTRAST: No results found for this visit on 04/26/17. and CT ABDOMEN PELVIS WITHOUT CONTRAST: No results found for this visit on 04/26/17., Echocardiogram:   2D echo with color flow doppler:   Results for orders placed or performed during the hospital encounter of 04/26/17   2D echo with color flow doppler   Result Value Ref Range    EF 60 55 - 65    Diastolic Dysfunction Yes (A)     Est.  PA Systolic Pressure 35.28     Tricuspid Valve Regurgitation TRIVIAL     and X-Ray: CXR: X-Ray Chest 1 View (CXR):   Results for orders placed or performed during the hospital encounter of 04/26/17   X-Ray Chest 1 View    Narrative    AP chest compared to 04/26/2017    Findings: The cardiomediastinal silhouette is within normal limits.  There is calcification of the aorta.  There is moderate right upper lobe consolidation increased from previous examination, superimposed on generalized groundglass change throughout the right lung.  There is no pleural effusion.    Impression     Right lung infiltrate with worsening right upper lobe consolidation, likely representing pneumonia.      Electronically signed by: Neil Ramírez MD  Date:     04/29/17  Time:    08:45     and X-Ray Chest PA and Lateral (CXR):   Results for orders placed or performed during the hospital encounter of 04/26/17   X-Ray Chest PA And Lateral    Narrative    PA and lateral chest compared to 04/29/2017    Findings: The cardiomediastinal silhouette is within normal limits.  Right upper lobe airspace disease is again seen superimposed on generalized groundglass changes throughout the right lung, with mild improved aeration right upper lobe noted.  Blunting of the cosmetic angles is evident on this exam compatible with the presence of small pleural effusions.    Impression     Persistent right lung interstitial and airspace disease with mildly improved aeration right upper lobe.  Small bilateral pleural effusions.      Electronically signed by: Neil Ramírez MD  Date:     04/29/17  Time:    13:20     and KUB: X-Ray Abdomen AP 1 View (KUB):   Results for orders placed or performed during the hospital encounter of 04/26/17   X-Ray Abdomen AP 1 View (KUB)    Narrative    The bowel gas pattern appears normal.  Moderate amounts of stool are present within the colon.  There are no signs of mass or organomegaly.  No abnormal intra-abdominal or pelvic  calcifications are identified.  Skeletal structures are intact.    Impression     No evidence of acute abdominal/pelvic abnormality.      Electronically signed by: Elian Antony  Date:     04/26/17  Time:    18:56      Assessment and Plan:       Active Diagnoses:    Diagnosis Date Noted POA    PRINCIPAL PROBLEM:  Pneumonia of right lung due to infectious organism [J18.9] 04/27/2017 Yes    Acute respiratory failure with hypoxia [J96.01] 05/01/2017 Yes    IPF (idiopathic pulmonary fibrosis) [J84.112] 04/28/2017 Yes    Acute diastolic heart failure [I50.31] 04/27/2017 Yes    Essential hypertension [I10] 04/27/2017 Yes    Legionella pneumonia [A48.1] 04/27/2017 Yes    Hyponatremia [E87.1] 04/26/2017 Yes      Problems Resolved During this Admission:    Diagnosis Date Noted Date Resolved POA       VTE Risk Mitigation         Ordered     enoxaparin injection 40 mg  Daily     Route:  Subcutaneous        04/27/17 1857     Medium Risk of VTE  Once      04/26/17 2022     Place BRIAN hose  Until discontinued      04/26/17 2022          Hyponatremia- probable inap SiADH  Rt lung infiltrate- negative for PE, patient reports frequent cough throughout the night, currently being treated with ABX therapy  No evidence of CHF at this time- EF 60%, mild diastolic dysfunction,   HTN- well controlled       PLAN:DVT prophylaxis  Continue amlodipine,losartan,metoprolol  Echo with good LV systolic function. Patient is being treated for pneumonia. Echo with good systolic function and CTA without evidence of PE. Clinically still very distressed with pneumonia  Keep dry          Transcribed by CHEN Garcia for Dr AYAAN Ashraf  Cardiology  [facility]    I attest that I have personally seen and examined this patient. I have reviewed and discussed the management in detail as outlined above.

## 2017-05-01 NOTE — ASSESSMENT & PLAN NOTE
started bipap, now sats drop to 65 without. His white count continues to climb although he is on vanc, zithromax and promaxin. He remains afebrile. He will be transferred to [facility] to an ICU bed as we need a higher level of care. I/D and pulmonology

## 2017-05-01 NOTE — ASSESSMENT & PLAN NOTE
Based on CT findings  Started steroids to see if improves sx and oxygenation  WBC will no longer be helpful in guiding antibx although it is continuing to elevate significantly  although his steroids  Are weaning  Dr. Tate consulted and bipap started over night. He is now dependent. Can not take off even to eat without dropping sats

## 2017-05-01 NOTE — ASSESSMENT & PLAN NOTE
There was suspicionon admission for this due to infection plus hypoNa    Was covered with macrolid on admission (2 days) then broadened coverage as WBC trending up (4/28) and hypoxia worsening (bipap now)    I think this is less likely at this point but a consideration. I think this may be atypical organism superimposed on IPF. unable to get sputum sample and consitioning not improving as ex[ected on broad coverage

## 2017-05-02 NOTE — PLAN OF CARE
Problem: Patient Care Overview  Goal: Plan of Care Review  Received pt on BiPAP 13/8 and 100%; Sats 100%--pt uncomfortable with BiPAP. Decreased settings to 10/5 and 60%; Sats remain 100%. MAGGIE Nevarez NP called--awaiting orders for titration; aerosol completed without incident; will continue to monitor.

## 2017-05-02 NOTE — PLAN OF CARE
Problem: Patient Care Overview  Goal: Plan of Care Review  Outcome: Ongoing (interventions implemented as appropriate)  Fall risk reviewed with patient and daughter, agreed to call before attempting to get out of bed. Risk of pressure injury discussed with pt and and family. Pt is able to reposition self in bed, needs reminders to do so. Denies pain. Monitoring respiratory status closely, currently requiring High Flow NC 15L @ 40%

## 2017-05-02 NOTE — ASSESSMENT & PLAN NOTE
Pneumonia of right lung due to infectious organism  Acute diastolic heart failure  Appreciate Pulmonology. Continue antibiotics and diuretics. Follow up multiple rheumatological labs. Wean high flow nasal cannula as tolerated. Had echo done 4/27/17 that showed normal LVEF with diastolic dysfunction and PASP of 35.

## 2017-05-02 NOTE — PROGRESS NOTES
[facility] Hospital Medicine  Progress Note    Patient Name: Sandeep Wolfe  MRN: [id number]  Patient Class: IP- Inpatient   Admission Date: 5/1/2017  Length of Stay: 1 days  Attending Physician: Cody Jay MD  Primary Care Provider: Juan José Berumen MD        Subjective:     Principal Problem:Acute respiratory failure with hypoxia    HPI:  Sandeep Wolfe is a 86 y.o. white man with hypertension, osteoarthritis with chronic left hip pain, dyslipidemia, benign prostatic hyperplasia status post transurethral prostatectomy on 7/11/14, anemia, and remote cigarette smoking history. He lives in [place], [place]. He is . His primary care physician is Dr. Juan José Berumen.    He developed a sore throat on Tuesday 4/18/17. The next day he had subjective fever and night sweats. On 4/23/17 he was seen at an urgent care clinic, was given a steroid injection and prescribed a course of azithromycin. He appeared better on 4/25/17 and an appointment with Dr. Berumen was cancelled. On 4/26/17 he was taken to [facility] for fatigue, shortness of breath, cough, and decreased oral intake. He was found to have hyponatremia (sodium 116), elevated BNP (559), and right upper and lower lobe infiltrates. He was hypoxic with oxygen saturation in the 80s. He was admitted and put on ceftriaxone and azithromycin. 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 Echocardiogram showed diastolic dysfunction. Cardiology saw him and felt there was no evidence of heart failure. Antibiotics were changed to meropenem and vancomycin. Chest CTA was done showing no pulmonary embolism, but showed subpleural cysts read as possible UIP. He was started on methylprednisolone on 4/28/17. Hypoxia worsened. Pulmonology saw him and put him on BiPAP.     He was transferred to [facility] on 5/01/17 for evaluation by a different pulmonologist.      Hospital Course:  He was started on empiric furosemide 40 mg IV TID by  Pulmonology, and steroids were weaned. He was put on piperacillin-tazobactam, azithromycin, and vancomycin. By the next morning his fluid status was net negative 3550 mL. He was switched to high flow nasal cannula. He was also constipated due to spending several days in the hospital bed.    Interval History: Dyspnea on exertion and constipation, otherwise feels like he is improving.     Review of Systems   Constitutional: Negative for chills and fever.   Respiratory: Positive for shortness of breath. Negative for cough.    Gastrointestinal: Positive for constipation. Negative for abdominal pain.     Objective:     Vital Signs (Most Recent):  Temp: 98.6 °F (37 °C) (05/02/17 0736)  Pulse: 61 (05/02/17 1126)  Resp: (!) 22 (05/02/17 1126)  BP: 123/67 (05/02/17 1000)  SpO2: (!) 94 % (05/02/17 1126) Vital Signs (24h Range):  Temp:  [97.7 °F (36.5 °C)-98.8 °F (37.1 °C)] 98.6 °F (37 °C)  Pulse:  [59-82] 61  Resp:  [18-48] 22  SpO2:  [69 %-100 %] 94 %  BP: (120-157)/(58-80) 123/67     Weight: 85.5 kg (188 lb 7.9 oz)  Body mass index is 25.56 kg/(m^2).    Intake/Output Summary (Last 24 hours) at 05/02/17 1234  Last data filed at 05/02/17 1000   Gross per 24 hour   Intake              720 ml   Output             3950 ml   Net            -3230 ml      Physical Exam   Constitutional: He is oriented to person, place, and time. He appears well-developed. No distress.   Cardiovascular: Normal rate and regular rhythm.    Pulmonary/Chest: Effort normal. He has rales.   Abdominal: Soft. There is no tenderness.   Neurological: He is alert and oriented to person, place, and time.   Psychiatric: He has a normal mood and affect.   Nursing note and vitals reviewed.      Significant Labs: All pertinent labs within the past 24 hours have been reviewed.    Significant Imaging: I have reviewed all pertinent imaging results/findings within the past 24 hours.   X-ray chest AP portable 5/01/17: Extensive detrimental change with increased  bilateral interstitial lung markings which is nonspecific but may be seen with pulmonary edema, multifocal pneumonia, or ARDS. No cardiomegaly.  Calcification of the aortic arch.  Trace bilateral pleural effusions.  Osseous structures demonstrate degenerative changes.  X-Ray Chest 1 View 5/02/17: Slight decrease in the diffuse left lung interstitial and ill-defined airspace opacities. Slight decrease in the right lower lung zone interstitial and airspace opacities, but slight increase in the right upper lung zone opacities. No pneumothorax. Slight increase in small right pleural effusion. No displaced fracture.    Assessment/Plan:      * Acute respiratory failure with hypoxia  Pneumonia of right lung due to infectious organism  Acute diastolic heart failure  Appreciate Pulmonology. Continue antibiotics and diuretics. Follow up multiple rheumatological labs. Wean high flow nasal cannula as tolerated. Had echo done 4/27/17 that showed normal LVEF with diastolic dysfunction and PASP of 35.         Hyponatremia  Could be related to the lung process. Has increased from admission at the OSH. Will continue to monitor.       Essential hypertension  Continue home metoprolol 25 mg BID and amlodipine 10 mg daily. Giving losartan at 25 mg daily which is down from 100 mg daily home dose.       VTE Risk Mitigation         Ordered     heparin (porcine) injection 5,000 Units  Every 12 hours     Route:  Subcutaneous        05/01/17 1729     Medium Risk of VTE  Once      05/01/17 1328     Place BRIAN hose  Until discontinued      05/01/17 1328     Place sequential compression device  Until discontinued      05/01/17 1328          Cody Jay MD  Department of Hospital Medicine   [facility]

## 2017-05-02 NOTE — PROGRESS NOTES
[facility]  Critical Care Medicine  Progress Note    Patient Name: Sandeep Wolfe  MRN: [id number]  Admission Date: 5/1/2017  Hospital Length of Stay: 1 days  Code Status: Full Code  Attending Provider: Cody Jay MD  Primary Care Provider: Juan José Berumen MD   Principal Problem: Acute respiratory failure with hypoxia    Subjective:   Slowly improving. UOP great and pt feels much better with diuresis. FiO2 requirements reducing.     Review of Systems   See above  Objective:     Vital Signs (Most Recent):  Temp: 98.2 °F (36.8 °C) (05/02/17 1137)  Pulse: 61 (05/02/17 1126)  Resp: (!) 22 (05/02/17 1126)  BP: 123/67 (05/02/17 1000)  SpO2: (!) 94 % (05/02/17 1126) Vital Signs (24h Range):  Temp:  [97.7 °F (36.5 °C)-98.8 °F (37.1 °C)] 98.2 °F (36.8 °C)  Pulse:  [59-82] 61  Resp:  [18-48] 22  SpO2:  [69 %-100 %] 94 %  BP: (120-157)/(58-80) 123/67     Weight: 85.5 kg (188 lb 7.9 oz)  Body mass index is 25.56 kg/(m^2).      Intake/Output Summary (Last 24 hours) at 05/02/17 1419  Last data filed at 05/02/17 1301   Gross per 24 hour   Intake              720 ml   Output             4225 ml   Net            -3505 ml       Physical Exam  Constitutional: He is oriented to person, place, and time. He appears well-developed and well-nourished. On HFNC. Appears comfortable  Head: Normocephalic.   Eyes: EOM are normal. Pupils are equal, round, and reactive to light.   Neck: Normal range of motion.   Cardiovascular: Normal rate.   Pulmonary/Chest: Effort normal. Bibasilar crackles  Musculoskeletal: Normal range of motion. He exhibits no edema.   Neurological: He is alert and oriented to person, place, and time.   Skin: Skin is warm and dry.      Vents:  Oxygen Concentration (%): 50 (05/02/17 1126)    Lines/Drains/Airways     Drain                 Urethral Catheter 05/01/17 1716 Latex 14 Fr. less than 1 day          Peripheral Intravenous Line                 Peripheral IV - Single Lumen 05/01/17 0552  Left;Anterior Hand less than 1 day                Significant Labs:    CBC/Anemia Profile:    Recent Labs  Lab 05/01/17 0518 05/01/17  1437 05/02/17  0832   WBC 34.45* 41.76* 30.20*   HGB 11.3* 12.2* 12.9*   HCT 31.2* 34.2* 36.3*   * 423* 356*   MCV 88 90 90   RDW 11.9 12.1 12.0        Chemistries:    Recent Labs  Lab 05/01/17 0518 05/01/17  1437 05/02/17  0351   * 124* 124*   K 4.6 4.9 4.0   CL 92* 91* 89*   CO2 22* 20* 23   BUN 26* 23 27*   CREATININE 0.9 0.8 0.9   CALCIUM 8.4* 8.8 8.3*   ALBUMIN 2.6* 2.8* 2.6*   PROT 6.0 6.5 6.2   BILITOT 0.6 0.8 0.6   ALKPHOS 128 141* 135   * 105* 82*   AST 43* 40 27   MG  --  2.3 2.1   PHOS  --  4.0 4.1       Assessment/Plan:   1. Acute hypoxemic resp failure  2. HFpEF  3. HTN  4. CAP/HAP  5. Hyponatremia  6. Leukocytosis        Neurology:  -AAO3, no issues     Cardiovascular:  Hemodynamics  -Stable     Rhythm  -NSR     HFpEF  -Cont diuresis     Respiratory:  -Acute hypoxemic resp failure  -ABG w/o hypercapnia  -Cont BiPAP PRN and HFNC during the day when awake  -lasix 40 mg IV TID  -Cont current abs. Will de-escalate tomorrow       -? UIP pattern on CT  -Not typical UIP pattern on CT. Lots of GGO that is not consistent with UIP  -Cont prednisone 40 mg oral.  -Auto immune panel workup with BENJAMIN and APRIL profile ordered.     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 Will monitor. Na 124 today.  -BUN and Cr are both stable     GI:  -Pt has not had a BM in several days   -Start some oral laxatives.      ID:  -Cont abs today. F/U culture data     Hematology  -H/H stable  -Platelet counts stable  -WBC count down trending.      Endo:  -TSH normal     Prevention:  -DVT prophylaxis  -Keep head of bed elevated  -Start PT/OT  -Tolerating full oral diet.     Code status FULL   Nadine Mcallister MD  Critical Care Medicine  [facility]

## 2017-05-02 NOTE — ASSESSMENT & PLAN NOTE
Continue home metoprolol 25 mg BID and amlodipine 10 mg daily. Giving losartan at 25 mg daily which is down from 100 mg daily home dose.

## 2017-05-02 NOTE — SUBJECTIVE & OBJECTIVE
Interval History: Dyspnea on exertion and constipation, otherwise feels like he is improving.     Review of Systems   Constitutional: Negative for chills and fever.   Respiratory: Positive for shortness of breath. Negative for cough.    Gastrointestinal: Positive for constipation. Negative for abdominal pain.     Objective:     Vital Signs (Most Recent):  Temp: 98.6 °F (37 °C) (05/02/17 0736)  Pulse: 61 (05/02/17 1126)  Resp: (!) 22 (05/02/17 1126)  BP: 123/67 (05/02/17 1000)  SpO2: (!) 94 % (05/02/17 1126) Vital Signs (24h Range):  Temp:  [97.7 °F (36.5 °C)-98.8 °F (37.1 °C)] 98.6 °F (37 °C)  Pulse:  [59-82] 61  Resp:  [18-48] 22  SpO2:  [69 %-100 %] 94 %  BP: (120-157)/(58-80) 123/67     Weight: 85.5 kg (188 lb 7.9 oz)  Body mass index is 25.56 kg/(m^2).    Intake/Output Summary (Last 24 hours) at 05/02/17 1234  Last data filed at 05/02/17 1000   Gross per 24 hour   Intake              720 ml   Output             3950 ml   Net            -3230 ml      Physical Exam   Constitutional: He is oriented to person, place, and time. He appears well-developed. No distress.   Cardiovascular: Normal rate and regular rhythm.    Pulmonary/Chest: Effort normal. He has rales.   Abdominal: Soft. There is no tenderness.   Neurological: He is alert and oriented to person, place, and time.   Psychiatric: He has a normal mood and affect.   Nursing note and vitals reviewed.      Significant Labs: All pertinent labs within the past 24 hours have been reviewed.    Significant Imaging: I have reviewed all pertinent imaging results/findings within the past 24 hours.   X-ray chest AP portable 5/01/17: Extensive detrimental change with increased bilateral interstitial lung markings which is nonspecific but may be seen with pulmonary edema, multifocal pneumonia, or ARDS. No cardiomegaly.  Calcification of the aortic arch.  Trace bilateral pleural effusions.  Osseous structures demonstrate degenerative changes.  X-Ray Chest 1 View 5/02/17:  Slight decrease in the diffuse left lung interstitial and ill-defined airspace opacities. Slight decrease in the right lower lung zone interstitial and airspace opacities, but slight increase in the right upper lung zone opacities. No pneumothorax. Slight increase in small right pleural effusion. No displaced fracture.

## 2017-05-02 NOTE — PLAN OF CARE
Problem: Patient Care Overview  Goal: Plan of Care Review  Pt received on high flow nasal cannula at  20 lpm and FiO2 .50 with SPO2 94%.  Pt in no apparent respiratory distress.  Will continue to monitor.

## 2017-05-02 NOTE — PLAN OF CARE
05/02/17 1101   Discharge Assessment   Assessment Type Discharge Planning Assessment   Confirmed/corrected address and phone number on facesheet? Yes   Assessment information obtained from? Patient   Expected Length of Stay (days) 2   Communicated expected length of stay with patient/caregiver yes   Type of Healthcare Directive Received Durable power of  for health care;Advance Directive   If Healthcare Directive is received, is it scanned into Epic? yes   Prior to hospitilization cognitive status: Alert/Oriented   Prior to hospitalization functional status: Independent   Current cognitive status: Alert/Oriented   Current Functional Status: Needs Assistance   Arrived From [facility] hospital  (transfer from [facility] for Pulmonary eval)   Lives With spouse   Able to Return to Prior Arrangements yes   Is patient able to care for self after discharge? Yes   How many people do you have in your home that can help with your care after discharge? 1   Who are your caregiver(s) and their phone number(s)? Arielluis miguel (spouse) [phone]   Patient's perception of discharge disposition home or selfcare   Readmission Within The Last 30 Days no previous admission in last 30 days   Patient currently being followed by outpatient case management? No   Patient currently receives home health services? No   Does the patient currently use HME? No   Patient currently receives private duty nursing? No   Patient currently receives any other outside agency services? No   Equipment Currently Used at Home none   Do you have any problems affording any of your prescribed medications? TBD   Is the patient taking medications as prescribed? yes   Do you have any financial concerns preventing you from receiving the healthcare you need? No   Does the patient have transportation to healthcare appointments? Yes   Transportation Available family or friend will provide   On Dialysis? No   Does the patient receive services at the [facility]? No   Are there any open cases? No   Discharge Plan A Home with family   Discharge Plan B Home Health   Patient/Family In Agreement With Plan yes

## 2017-05-03 NOTE — SUBJECTIVE & OBJECTIVE
Interval History: Dyspnea on exertion.     Review of Systems   Constitutional: Negative for chills and fever.   Respiratory: Positive for shortness of breath. Negative for cough.    Cardiovascular: Negative for chest pain and palpitations.     Objective:     Vital Signs (Most Recent):  Temp: 98.1 °F (36.7 °C) (05/03/17 1536)  Pulse: 86 (05/03/17 1500)  Resp: (!) 25 (05/03/17 1500)  BP: 132/60 (05/03/17 1500)  SpO2: (!) 90 % (05/03/17 1500) Vital Signs (24h Range):  Temp:  [97.6 °F (36.4 °C)-98.9 °F (37.2 °C)] 98.1 °F (36.7 °C)  Pulse:  [] 86  Resp:  [18-29] 25  SpO2:  [80 %-100 %] 90 %  BP: (114-151)/() 132/60     Weight: 82 kg (180 lb 12.4 oz)  Body mass index is 24.52 kg/(m^2).    Intake/Output Summary (Last 24 hours) at 05/03/17 1627  Last data filed at 05/03/17 1500   Gross per 24 hour   Intake              600 ml   Output             2755 ml   Net            -2155 ml      Physical Exam   Constitutional: He is oriented to person, place, and time. He appears well-developed. No distress.   Cardiovascular: Normal rate and regular rhythm.    Pulmonary/Chest: Effort normal. He has rales.   Abdominal: Soft. There is no tenderness.   Neurological: He is alert and oriented to person, place, and time.   Psychiatric: He has a normal mood and affect.   Nursing note and vitals reviewed.      Significant Labs: All pertinent labs within the past 24 hours have been reviewed.    Significant Imaging: I have reviewed all pertinent imaging results/findings within the past 24 hours.

## 2017-05-03 NOTE — ASSESSMENT & PLAN NOTE
Continue home metoprolol 25 mg BID and amlodipine 10 mg daily. Giving losartan at 25 mg daily which is down from 100 mg daily home dose. Blood pressure controlled.

## 2017-05-03 NOTE — ASSESSMENT & PLAN NOTE
Pneumonia of right lung due to infectious organism  Acute diastolic heart failure  Appreciate Pulmonology. Continue antibiotics and deescalate. Continue furosemide. Wean high flow nasal cannula as tolerated. Had echo done 4/27/17 that showed normal LVEF with diastolic dysfunction and PASP of 35.

## 2017-05-03 NOTE — MEDICAL/APP STUDENT
[facility] Pulmonary / Critical Care Medicine  Medical Student MS3 Progress Note      S: Mr. Sandeep Wolfe is a 87 y/o male with a h/o HFpEF and HTN being evaluated in the ICU for acute hypoxemic respiratory failure. Yesterday patient was weened down to 40% O2 on HFNC but desaturated into the high 80's when moving around his room. Was placed back on BiPAP overnight and has remained on HFNC 100% most of this AM. Eating well and reports BM this AM.     O:  VS Last 24h: Temp max: 98.2 P:61-82 R: 18-27 BP: 109-151/58-73 Sa O2: %       I/O: In 1000 ( , IVPB 700) Out 2405 (urine)       Vent: HFNC @ % > Bipap at 50% on 10/5       PE:   Gen: HFNC in place, NAD.   HEENT: PERRL, normal sclera, normal conjuctiva  CV: RRR, no murmur heard  Resp: diffuse crackles bilaterally, no wheezes, no increased work of breathing  Abd: NT, ND , no hepatosplenomegaly, no masses  Ext: No LE edema, SCD's in place  Neuro: AAOx3  Skin: intact, no rashes or lesions, no signs of bleeding.       Lines: PIV in L  (5/1), catheter (5/1)       Labs:  CBC: WBC 30 -> 27.24 / hgb 11.3 / hct 31.9 / plt 367  CMP: Na 124->127, K 3.3, Cl 88, CO2 26, BUN/Cr 29/1, glucose 115      Imaging:  No new imaging      Micro:   Blood cx NGTDx2  Sputum cx pending         Medications:  Scheduled Meds:   albuterol-ipratropium 2.5mg-0.5mg/3mL  3 mL Nebulization Q4H    amlodipine  10 mg Oral Daily    azithromycin  500 mg Intravenous Q24H    furosemide  40 mg Intravenous BID    heparin (porcine)  5,000 Units Subcutaneous Q12H    losartan  25 mg Oral Daily    metoprolol tartrate  25 mg Oral BID    piperacillin-tazobactam 4.5 g in dextrose 5 % 100 mL IVPB (ready to mix system)  4.5 g Intravenous Q8H    polyethylene glycol  17 g Oral BID    predniSONE  40 mg Oral Daily    senna-docusate 8.6-50 mg  1 tablet Oral BID    sodium chloride 0.9%  3 mL Intravenous Q8H     Continuous Infusions:   PRN Meds:.acetaminophen, benzonatate,  ondansetron      A/P:  1. Neuro:  1. AAO x3, no sedation  2. CVS:  1. HFpEF - VS stable, no LE edema, lasix down to 40 BID   2. Hemodynamically stable  3. Pulmonary:  1. Acute hypoxic respiratory failure HAP/CAP vs ILD    1. CT with pattern not typical for UIP, continue prednisone 40  2. Sputum culture pending, afebrile, blood culture neg x2d. Can stop vanc now but continue with piptazo/azithro.    3. BENJAMIN and SSA negative  4. Keep on HFNC w/ BiPAP PRN  4. FEN/GI  1. Full diet  2. BM today  5. Renal  1. Hyponatremia- Possibly 2/2 lung process vs fluid overload, slowly improving with Lasix and tx of underlying lung process.   2. UOP 1.2 ml/kg/hr, BUN/Cr at 29/1.    6. Heme/ID:  1. Leukocytosis - WBC trending down, will continue to monitor.   2. H&H stable  3. Cultures: Sputum pending / Blood cx negative x2d - will stop vanc, continue with  azithro and pip-tazo  7. Endocrine:  1. glucose stable.  8. DVT/VTE PPx:  1. SCD's in place  2. continue heparin ppx  3. PT/OT

## 2017-05-03 NOTE — PLAN OF CARE
Problem: Physical Therapy Goal  Goal: Physical Therapy Goal  Goals to be met by: 2017     Patient will increase functional independence with mobility by performin. Supine to sit to be assessed   2. Sit to stand transfer to be assessed  3. Bed to chair transfer to be assessed   4. Gait To be assessed .   5. Sitting at edge of bed to be assessed  6 Lower extremity exercise program x 10-15 reps per handout, with supervision  Outcome: Ongoing (interventions implemented as appropriate)  Limited functional PT evaluation completed.  Pt could benefit from daily skilled PT services  In order to maximize function prior to D/C.  Ongoing asessement pending pt progress for disposition and DME.  Pt limited by decreased SPO2 with exertion.

## 2017-05-03 NOTE — PLAN OF CARE
Problem: Patient Care Overview  Goal: Plan of Care Review  Outcome: Ongoing (interventions implemented as appropriate)  Tristan catheter continued due to extreme SOB with any exertion, excellent urine output. Remains on HFNC which is being adjusted per RT. A&Ox4. BM this a.m. On bedside commode. Daughters at bedside most of this shift. Very pleasant family. Denies pain. WBC elevated but trending down. Will continue to monitor closely.

## 2017-05-03 NOTE — PT/OT/SLP EVAL
"Physical Therapy  Evaluation    Sandeep Wolfe   MRN: [id number]   Admitting Diagnosis: Acute respiratory failure with hypoxia    PT Received On: 17  PT Start Time: 1334     PT Stop Time: 1345    PT Total Time (min): 11 min       Billable Minutes:  Evaluation 11    Diagnosis: Acute respiratory failure with hypoxia      Past Medical History:   Diagnosis Date    Arthritis     BPH (benign prostatic hyperplasia)     Cataract     CHF (congestive heart failure)     Hyperlipidemia     Hypertension     Kidney stone       Past Surgical History:   Procedure Laterality Date    APPENDECTOMY      LASER OF PROSTATE W/ GREEN LIGHT PVP      TONSILLECTOMY         General Precautions: Standard, fall  Orthopedic Precautions: N/A   Braces: N/A       Do you have any cultural, spiritual, Quaker conflicts, given your current situation?: none    Patient History:  Lives With: spouse  Living Arrangements: house  Home Accessibility:  (no concerns)  Transportation Available: family or friend will provide, car  Living Environment Comment: walk-in shower with built in seat.  Avid walker, Ind with ambulation and ADL's, driving  Equipment Currently Used at Home: none  DME owned (not currently used): none    Previous Level of Function:  Ambulation Skills: independent  Transfer Skills: independent  ADL Skills: independent  Work/Leisure Activity: independent    Subjective:  Communicated with nsg prior to session.  Nsg states that pt "desatted" with OOB to BSC earlier.  Only OK for bed level eval/activity  Pt agreeable to eval.    Chief Complaint: SOB  Patient goals: PLOF    Pain Ratin/10               Pain Rating Post-Intervention: 0/10    Objective:   Patient found with: pulse ox (continuous), telemetry, blood pressure cuff (Hi-Flow O2)     Cognitive Exam:  Oriented to: Person, Place, Time and Situation    Follows Commands/attention: Follows one-step commands  Communication: clear/fluent  Safety awareness/insight to disability: " impaired    Physical Exam:  Postural examination/scapula alignment: Rounded shoulder and Head forward    Skin integrity: Visible skin intact  Edema: None noted     Sensation:   Intact  light/touch B Le's    Upper Extremity Range of Motion:  Lower Extremity Range of Motion:  Right Lower Extremity: WFL  Left Lower Extremity: WFL    Lower Extremity Strength:  Right Lower Extremity: WFL  Left Lower Extremity: WFL     Fine motor coordination:  N/T    Gross motor coordination: WFL    Functional Mobility:  Bed Mobility:  Scooting/Bridging: Minimum Assistance (Min A to HOB)    Transfers:  Sit <> Stand Assistance:  (activity did not occur)    Gait:   Gait Distance: Activity did not occur    Stairs:  N/T    Balance:   Static Sit: N/T  Dynamic Sit: N/T  Static Stand: N/T  Dynamic stand: N/T    Therapeutic Activities and Exercises:  Limited  Functional eval 2/2 decreased SPO2 with scooting up to HOB.  SPo2 on Hi- flow at rest 88-92%, with exertion 76-86%.  Instructed pt on performing B AP's, HS, and TKE's x 5 reps periodically while lying in bed.  Pt verbalized/demonstrated understanding.  Pt Instructed on breathing in through nose and out through mouth throughout treatment session.  Needs reinforcement  AM-PAC 6 CLICK MOBILITY  How much help from another person does this patient currently need?   1 = Unable, Total/Dependent Assistance  2 = A lot, Maximum/Moderate Assistance  3 = A little, Minimum/Contact Guard/Supervision  4 = None, Modified [place]/Independent    Turning over in bed (including adjusting bedclothes, sheets and blankets)?: 3  Sitting down on and standing up from a chair with arms (e.g., wheelchair, bedside commode, etc.): 3  Moving from lying on back to sitting on the side of the bed?: 3  Moving to and from a bed to a chair (including a wheelchair)?: 3  Need to walk in hospital room?: 2  Climbing 3-5 steps with a railing?: 2  Total Score: 16     AM-PAC Raw Score CMS G-Code Modifier Level of Impairment  Assistance   6 % Total / Unable   7 - 9 CM 80 - 100% Maximal Assist   10 - 14 CL 60 - 80% Moderate Assist   15 - 19 CK 40 - 60% Moderate Assist   20 - 22 CJ 20 - 40% Minimal Assist   23 CI 1-20% SBA / CGA   24 CH 0% Independent/ Mod I     Patient left HOB elevated with all lines intact, call button in reach, nsg notified and daughter present.    Assessment:   Sandeep Wolfe is a 86 y.o. male with a medical diagnosis of Acute respiratory failure with hypoxia and presents with decreased functional independence 2/2 decreased SPO2 with exertion..Pt could benefit from skilled PT services to adddress deficits below in order to maximize function prior to D/C    Rehab identified problem list/impairments: Rehab identified problem list/impairments: weakness, impaired endurance, impaired self care skills, impaired functional mobilty, decreased safety awareness, impaired cardiopulmonary response to activity    Rehab potential is good.    Activity tolerance: Fair    Discharge recommendations: Discharge Facility/Level Of Care Needs:  (ongoing asssessment pending pt progress)     Barriers to discharge: Barriers to Discharge: Decreased caregiver support    Equipment recommendations: Equipment Needed After Discharge:  (ongoing assessment pending pt progress)     GOALS:   Physical Therapy Goals        Problem: Physical Therapy Goal    Goal Priority Disciplines Outcome Goal Variances Interventions   Physical Therapy Goal     PT/OT, PT Ongoing (interventions implemented as appropriate)     Description:  Goals to be met by: 2017     Patient will increase functional independence with mobility by performin. Supine to sit to be assessed   2. Sit to stand transfer to be assessed  3. Bed to chair transfer to be assessed   4. Gait  To be assessed .   5. Sitting at edge of bed to be assessed  6 Lower extremity exercise program x 10-15 reps per handout, with supervision                PLAN:    Patient to be seen daily to  address the above listed problems via gait training, therapeutic activities, therapeutic exercises  Plan of Care expires: 05/24/17  Plan of Care reviewed with: patient, daughter    Functional Assessment Tool Used: AM-PAC  Score: 16  Functional Limitation: Mobility: Walking and moving around  Mobility: Walking and Moving Around Current Status (): CK  Mobility: Walking and Moving Around Goal Status (): LULY Ulrich, PT  05/03/2017

## 2017-05-03 NOTE — PT/OT/SLP EVAL
Occupational Therapy  Evaluation    Sandeep Wolfe   MRN: [id number]   Admitting Diagnosis: Acute respiratory failure with hypoxia    OT Date of Treatment: 05/03/17   OT Start Time: 1122  OT Stop Time: 1138  OT Total Time (min): 16 min    Billable Minutes:  Evaluation 16  Total Time 16    Diagnosis: Acute respiratory failure with hypoxia   Diagnoses of Respiratory failure and Acute respiratory failure with hypoxia were pertinent to this visit.      Past Medical History:   Diagnosis Date    Arthritis     BPH (benign prostatic hyperplasia)     Cataract     CHF (congestive heart failure)     Hyperlipidemia     Hypertension     Kidney stone       Past Surgical History:   Procedure Laterality Date    APPENDECTOMY      LASER OF PROSTATE W/ GREEN LIGHT PVP      TONSILLECTOMY         Referring physician: Pierre  Date referred to OT: 5/2/2017    General Precautions: Standard, fall  Orthopedic Precautions: N/A  Braces: N/A    Do you have any cultural, spiritual, Orthodox conflicts, given your current situation?: none     Patient History:  Living Environment  Lives With: spouse  Living Arrangements: house  Transportation Available: family or friend will provide  Living Environment Comment: Pt. lives with spouse iin SSH with no steps to enter; has walk n shower with [id number].  Pt. performs all ADLS at indep level; ambulates without a device; advid walker.  Drives and deos IADLS .  Daughter says pt, will have somepone at home  with hiom when discharged home.   Equipment Currently Used at Home: none (pt has access to SPC that belonged to his wife.)    Prior level of function:   Bed Mobility/Transfers: independent  Grooming: independent  Bathing: needs device  Upper Body Dressing: independent  Lower Body Dressing: independent  Toileting: independent  Home Management Skills: independent  Homemaking Responsibilities: Yes  Driving License: Yes  Mode of Transportation: Car     Dominant hand:  right    Subjective:  Communicated with nurse prior to session.    Chief Complaint: none  Patient/Family stated goals: none    Pain Ratin/10              Pain Rating Post-Intervention: 0/10    Objective:  Patient found with: oxygen (full ICU monitoring; Hi -flow O2)    Cognitive Exam:  Oriented to: Person, Place, Time and Situation  Follows Commands/attention: Follows two-step commands  Communication: clear/fluent  Memory:  No Deficits noted  Safety awareness/insight to disability: intact  Coping skills/emotional control: Appropriate to situation    Visual/perceptual:  Intact    Physical Exam:  Postural examination/scapula alignment: No postural abnormalities identified  Skin integrity: Visible skin intact  Edema: None noted BUE    Sensation:   Intact    Upper Extremity Range of Motion:  Right Upper Extremity: WFL  Left Upper Extremity: WFL    Upper Extremity Strength:  Right Upper Extremity: WFL  Left Upper Extremity: WFL   Strength: WFL    Fine motor coordination:   Intact    Gross motor coordination: WFL    Functional Mobility:  Bed Mobility:  Rolling/Turning to Left: Other (see comments) (desats with attempt)  Supine to Sit: Other (see comments) (moved his legs over EOB , desatted to 86% ; abadoned effort)    Transfers:  Sit <> Stand Assistance: Activity did not occur (nurse deferred 2/2 pt desatted when got OOB earlier and required extended time to recover)    Functional Ambulation: NA    Activities of Daily Living:  Feeding Level of Assistance: Set-up Assistance    Grooming Position: other (bedlevel)  Grooming Level of Assistance: Supervision (O2 88%-90% ; educated pt in pacing tech during session)      Balance:   Static Sit: NA    Dynamic Sit: NA  Static Stand: NA  Dynamic stand: NA    Therapeutic Activities and Exercises:  Purse lip breathing and pacing tech with simple g/hygiene; role pf OT and POc    AM-PAC 6 CLICK ADL  How much help from another person does this patient currently need?  1 =  "Unable, Total/Dependent Assistance  2 = A lot, Maximum/Moderate Assistance  3 = A little, Minimum/Contact Guard/Supervision  4 = None, Modified [place]/Independent    Putting on and taking off regular lower body clothing? : 2  Bathing (including washing, rinsing, drying)?: 2  Toileting, which includes using toilet, bedpan, or urinal? : 2  Putting on and taking off regular upper body clothing?: 3  Taking care of personal grooming such as brushing teeth?: 3  Eating meals?: 3  Total Score: 15    AM-PAC Raw Score CMS "G-Code Modifier Level of Impairment Assistance   6 % Total / Unable   7 - 9 CM 80 - 100% Maximal Assist   10 - 14 CL 60 - 80% Moderate Assist   15 - 19 CK 40 - 60% Moderate Assist   20 - 22 CJ 20 - 40% Minimal Assist   23 CI 1-20% SBA / CGA   24 CH 0% Independent/ Mod I       Patient left supine with all lines intact, call button in reach, nurse notified and daughter present    Assessment:  Sandeep Wolfe is a 86 y.o. male with a medical diagnosis of Acute respiratory failure with hypoxia  [facility]  [facility]2 pt. desats when got OOB yesterday and today to [facility] with extended recovery period  per nurse's report. Pt. presents to OT with SOB on minimal exertion  with simple ADLS in bed; O2 saturation 88%-90% with hi-flow O2.  DC placement pending further assessment.  Continue with OT POC.    Rehab identified problem list/impairments: Rehab identified problem list/impairments: weakness, impaired functional mobilty, impaired endurance, impaired self care skills, impaired cardiopulmonary response to activity    Rehab potential is good.    Activity tolerance: Poor    Discharge recommendations: Discharge Facility/Level Of Care Needs:  (TBD ongoing assessment 2/2 desats with OOB activity)     Barriers to discharge:      Equipment recommendations:  (TBD)     GOALS:   Occupational Therapy Goals        Problem: Occupational Therapy Goal    Goal Priority Disciplines Outcome Interventions "   Occupational Therapy Goal     OT, PT/OT Ongoing (interventions implemented as appropriate)    Description:  Goals to be met by: 5/12/2017    Patient will increase functional independence with ADLs by performing: with O2 saturation>90%    UE Dressing with Set-up Assistance.  LE Dressing with Minimal Assistance.  Grooming while seated with Set-up Assistance.  Toileting from bedside commode with Stand-by Assistance for hygiene and clothing management.   Sitting at edge of bed 20 minutes with Modified [place].  Supine to sit with Modified [place].  Stand pivot transfers with Supervision.  Toilet transfer to bedside commode with Supervision.  Upper extremity exercise program x5 reps per handout, with supervision.                PLAN:  Patient to be seen 5 x/week to address the above listed problems via self-care/home management, therapeutic activities, therapeutic exercises  Plan of Care expires: 06/03/17  Plan of Care reviewed with: patient, daughter         Keyona Bradford, OT  05/03/2017

## 2017-05-03 NOTE — PLAN OF CARE
Problem: Patient Care Overview  Goal: Plan of Care Review  Received pt on CF of 15L/90%; tolerating well; aerosol completed without incident; will continue to monitor.

## 2017-05-03 NOTE — PROGRESS NOTES
[facility]  Critical Care Medicine  Progress Note    Patient Name: Sandeep Wolfe  MRN: [id number]  Admission Date: 5/1/2017  Hospital Length of Stay: 2 days  Code Status: Full Code  Attending Provider: Cdoy Jay MD  Primary Care Provider: Juan José Berumen MD   Principal Problem: Acute respiratory failure with hypoxia    Subjective:   Says he feels well. Eating well. Had a good BM this am.   Review of Systems   See above  Objective:     Vital Signs (Most Recent):  Temp: 97.9 °F (36.6 °C) (05/03/17 0736)  Pulse: 76 (05/03/17 1240)  Resp: 18 (05/03/17 1240)  BP: 127/66 (05/03/17 1200)  SpO2: (!) 93 % (05/03/17 1240) Vital Signs (24h Range):  Temp:  [97.6 °F (36.4 °C)-98.2 °F (36.8 °C)] 97.9 °F (36.6 °C)  Pulse:  [] 76  Resp:  [18-29] 18  SpO2:  [80 %-100 %] 93 %  BP: (109-151)/() 127/66     Weight: 82 kg (180 lb 12.4 oz)  Body mass index is 24.52 kg/(m^2).      Intake/Output Summary (Last 24 hours) at 05/03/17 1254  Last data filed at 05/03/17 0900   Gross per 24 hour   Intake             1250 ml   Output             1805 ml   Net             -555 ml       Physical Exam  Constitutional: He is oriented to person, place, and time. He appears well-developed and well-nourished. On HFNC. Appears comfortable  Head: Normocephalic.   Eyes: EOM are normal. Pupils are equal, round, and reactive to light.   Neck: Normal range of motion.   Cardiovascular: Normal rate.   Pulmonary/Chest: Effort normal. Bibasilar crackles  Musculoskeletal: Normal range of motion. He exhibits no edema.   Neurological: He is alert and oriented to person, place, and time.   Skin: Skin is warm and dry.      Vents:  Oxygen Concentration (%): 75 (05/03/17 1240)    Lines/Drains/Airways     Drain                 Urethral Catheter 05/01/17 1716 Latex 14 Fr. 1 day          Peripheral Intravenous Line                 Peripheral IV - Single Lumen 05/01/17 1630 Left;Anterior Hand 1 day                Significant  Labs:    CBC/Anemia Profile:    Recent Labs  Lab 05/01/17  1437 05/02/17  0832 05/03/17  0355   WBC 41.76* 30.20* 27.24*   HGB 12.2* 12.9* 11.3*   HCT 34.2* 36.3* 31.9*   * 356* 367*   MCV 90 90 88   RDW 12.1 12.0 11.9        Chemistries:    Recent Labs  Lab 05/01/17  1437 05/02/17  0351 05/03/17  0355   * 124* 127*   K 4.9 4.0 3.3*   CL 91* 89* 88*   CO2 20* 23 26   BUN 23 27* 27*   CREATININE 0.8 0.9 1.0   CALCIUM 8.8 8.3* 8.0*   ALBUMIN 2.8* 2.6* 2.5*   PROT 6.5 6.2 6.0   BILITOT 0.8 0.6 0.8   ALKPHOS 141* 135 139*   * 82* 119*   AST 40 27 55*   MG 2.3 2.1 2.0   PHOS 4.0 4.1 3.8       Assessment/Plan:   1. Acute hypoxemic resp failure  2. HFpEF  3. HTN  4. CAP/HAP  5. Hyponatremia  6. Leukocytosis        Neurology:  -AAO3, no issues     Cardiovascular:  Hemodynamics  -Stable     Rhythm  -NSR     HFpEF  -Cont diuresis-decreased lasix to 40 BID     Respiratory:  -Acute hypoxemic resp failure  -ABG w/o hypercapnia  -Cont BiPAP PRN and HFNC during the day when awake  -lasix 40 mg IV BID  -Stop vanc today. Cont other abs for now.        -? UIP pattern on CT  -Not typical UIP pattern on CT. Lots of GGO that is not consistent with UIP  -Cont prednisone 40 mg oral.  -BENJAMIN neg.      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 Na now slowly improving.   -BUN and Cr are both stable     GI:  -Had BM today  -Cont to monitor      ID:  -Stop vanc. Cont gram neg and atypical coverage.    Hematology  -H/H stable  -Platelet counts stable  -WBC count down trending.      Endo:  -TSH normal     Prevention:  -DVT prophylaxis  -Keep head of bed elevated  - PT/OT  -Tolerating full oral diet.     Code status FULL   Nadine Mcallister MD  Critical Care Medicine  [facility]

## 2017-05-03 NOTE — PLAN OF CARE
AAOx4, pt still requires high flow NC or BIPAP to maintain sats >88%. SOB on exertion. Pt on IV antibiotic therapy. Urine out put for night shift was 980 ml clear yellow urine. Safety maintain.

## 2017-05-03 NOTE — PROGRESS NOTES
[facility] Hospital Medicine  Progress Note    Patient Name: Sandeep Wolfe  MRN: [id number]  Patient Class: IP- Inpatient   Admission Date: 5/1/2017  Length of Stay: 2 days  Attending Physician: Cody Jay MD  Primary Care Provider: Juan José Berumen MD        Subjective:     Principal Problem:Acute respiratory failure with hypoxia    HPI:  Sandeep Wolfe is a 86 y.o. white man with hypertension, osteoarthritis with chronic left hip pain, dyslipidemia, benign prostatic hyperplasia status post transurethral prostatectomy on 7/11/14, anemia, and remote cigarette smoking history. He lives in [place], [place]. He is . His primary care physician is Dr. Juan José Berumen.    He developed a sore throat on Tuesday 4/18/17. The next day he had subjective fever and night sweats. On 4/23/17 he was seen at an urgent care clinic, was given a steroid injection and prescribed a course of azithromycin. He appeared better on 4/25/17 and an appointment with Dr. Berumen was cancelled. On 4/26/17 he was taken to [facility] for fatigue, shortness of breath, cough, and decreased oral intake. He was found to have hyponatremia (sodium 116), elevated BNP (559), and right upper and lower lobe infiltrates. He was hypoxic with oxygen saturation in the 80s. He was admitted and put on ceftriaxone and azithromycin. 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 Echocardiogram showed diastolic dysfunction. Cardiology saw him and felt there was no evidence of heart failure. Antibiotics were changed to meropenem and vancomycin. Chest CTA was done showing no pulmonary embolism, but showed subpleural cysts read as possible UIP. He was started on methylprednisolone on 4/28/17. Hypoxia worsened. Pulmonology saw him and put him on BiPAP.     He was transferred to [facility] on 5/01/17 for evaluation by a different pulmonologist.      Hospital Course:  He was started on empiric furosemide 40 mg IV TID by  Pulmonology, and steroids were weaned. He was put on piperacillin-tazobactam, azithromycin, and vancomycin. By the next morning his fluid status was net negative 3550 mL. He was switched to high flow nasal cannula. He was also constipated due to spending several days in the hospital bed. He was started on stool softeners and Physical/Occupational Therapy. He had significant dyspnea with movement but was able to be moved from the bed to the beside commode on 5/03/17, allowing time afterward for recovery. BENJAMIN, anti-double stranded DNA, anti-SSA, anti-SSB, ANCA, and HIV antibodies were negative. Vancomycin was stopped.     Interval History: Dyspnea on exertion.     Review of Systems   Constitutional: Negative for chills and fever.   Respiratory: Positive for shortness of breath. Negative for cough.    Cardiovascular: Negative for chest pain and palpitations.     Objective:     Vital Signs (Most Recent):  Temp: 98.1 °F (36.7 °C) (05/03/17 1536)  Pulse: 86 (05/03/17 1500)  Resp: (!) 25 (05/03/17 1500)  BP: 132/60 (05/03/17 1500)  SpO2: (!) 90 % (05/03/17 1500) Vital Signs (24h Range):  Temp:  [97.6 °F (36.4 °C)-98.9 °F (37.2 °C)] 98.1 °F (36.7 °C)  Pulse:  [] 86  Resp:  [18-29] 25  SpO2:  [80 %-100 %] 90 %  BP: (114-151)/() 132/60     Weight: 82 kg (180 lb 12.4 oz)  Body mass index is 24.52 kg/(m^2).    Intake/Output Summary (Last 24 hours) at 05/03/17 1627  Last data filed at 05/03/17 1500   Gross per 24 hour   Intake              600 ml   Output             2755 ml   Net            -2155 ml      Physical Exam   Constitutional: He is oriented to person, place, and time. He appears well-developed. No distress.   Cardiovascular: Normal rate and regular rhythm.    Pulmonary/Chest: Effort normal. He has rales.   Abdominal: Soft. There is no tenderness.   Neurological: He is alert and oriented to person, place, and time.   Psychiatric: He has a normal mood and affect.   Nursing note and vitals  reviewed.      Significant Labs: All pertinent labs within the past 24 hours have been reviewed.    Significant Imaging: I have reviewed all pertinent imaging results/findings within the past 24 hours.     Assessment/Plan:      * Acute respiratory failure with hypoxia  Pneumonia of right lung due to infectious organism  Acute diastolic heart failure  Appreciate Pulmonology. Continue antibiotics and deescalate. Continue furosemide. Wean high flow nasal cannula as tolerated. Had echo done 4/27/17 that showed normal LVEF with diastolic dysfunction and PASP of 35.         Hyponatremia  Likely SIADH. Improving. Will continue to monitor.       Essential hypertension  Continue home metoprolol 25 mg BID and amlodipine 10 mg daily. Giving losartan at 25 mg daily which is down from 100 mg daily home dose. Blood pressure controlled.       VTE Risk Mitigation         Ordered     heparin (porcine) injection 5,000 Units  Every 12 hours     Route:  Subcutaneous        05/01/17 1729     Medium Risk of VTE  Once      05/01/17 1328     Place BRIAN hose  Until discontinued      05/01/17 1328     Place sequential compression device  Until discontinued      05/01/17 1328        Disposition: Transfer out of ICU.    Cody Jay MD  Department of Hospital Medicine   [facility]

## 2017-05-03 NOTE — PLAN OF CARE
Problem: Occupational Therapy Goal  Goal: Occupational Therapy Goal  Goals to be met by: 5/12/2017    Patient will increase functional independence with ADLs by performing: with O2 saturation>90%    UE Dressing with Set-up Assistance.  LE Dressing with Minimal Assistance.  Grooming while seated with Set-up Assistance.  Toileting from bedside commode with Stand-by Assistance for hygiene and clothing management.   Sitting at edge of bed 20 minutes with Modified [place].  Supine to sit with Modified [place].  Stand pivot transfers with Supervision.  Toilet transfer to bedside commode with Supervision.  Upper extremity exercise program x5 reps per handout, with supervision.  Outcome: Ongoing (interventions implemented as appropriate)  Pt. Presents to OT for limited eval 2/2 desats to low 80's with minimal transitional movement in bed.  Pt performed feeding with setup per nurse 's report; grooming supervision for oral hygiene with O2 saturation 88%-90% with hi-flow O2; pt instructed in pacing tech and purse lip breathing with activity.  DC placement pending further assessment.  Continue with OT POC.

## 2017-05-04 NOTE — PT/OT/SLP PROGRESS
Occupational Therapy  Treatment    Sandeep Wolfe   MRN: [id number]   Admitting Diagnosis: Acute respiratory failure with hypoxia    OT Date of Treatment: 17   OT Start Time: 1320  OT Stop Time: 1338  OT Total Time (min): 18 min    Billable Minutes:  Self Care/Home Management 18 and Total Time 18    General Precautions: Standard, fall  Orthopedic Precautions: N/A  Braces: N/A    Do you have any cultural, spiritual, Episcopal conflicts, given your current situation?: none    Subjective:  Communicated with nurse prior to session.      Pain Ratin/10              Pain Rating Post-Intervention: 0/10    Objective:  Patient found with: peripheral IV, telemetry, pulse ox (continuous), boucher catheter, oxygen (hi-flow O2)     Functional Mobility:  Bed Mobility:  Rolling/Turning to Left:  (bedlevel activity per nurse 2/2 pt desats and respiratory status worse today)    Transfers:   Sit <> Stand Assistance: Activity did not occur    Functional Ambulation: NA    Activities of Daily Living:    Grooming Position: other (bedlevel with HOB elevated)  Grooming Level of Assistance: Supervision (using pacing tech with min erbal cueing brushing teeth; SPO2 84-94% with hi-flow and quick recovery; washed face SPO2 90%)                Balance:   NA  Therapeutic Activities and Exercises:  Pt. with decreased respiratory status today per nurse.  Okayed bed level OT. Pt. Remains on Hi-Flow O2 during the day and BIPAP at night and having difficulty maintaining SPO2 stauration above 88% per nurse's report.  Pt. performed groomng with Supervion to brush teeth with SPO2 levels ranging from 84%-94% using pacing tech with min vc; quick O2 recovery;washed face with Supervision. Pt. performed 5 reps single arm raises, bicep curls and finger flex/ext using exhale on exertion tech-having pt to count aloud for exhalation tech with good results and used pacing tech, resting after every 5 rep. Pt. maintained SPO2 saturation levels greater than 90%.  Family and pt instructed to do AROM ex 1-2 daily.     AM-PAC 6 CLICK ADL   How much help from another person does this patient currently need?   1 = Unable, Total/Dependent Assistance  2 = A lot, Maximum/Moderate Assistance  3 = A little, Minimum/Contact Guard/Supervision  4 = None, Modified [place]/Independent    Putting on and taking off regular lower body clothing? : 2  Bathing (including washing, rinsing, drying)?: 2  Toileting, which includes using toilet, bedpan, or urinal? : 2  Putting on and taking off regular upper body clothing?: 3  Taking care of personal grooming such as brushing teeth?: 3  Eating meals?: 3  Total Score: 15     AM-PAC Raw Score CMS G-Code Modifier Level of Impairment Assistance   6 % Total / Unable   7 - 9 CM 80 - 100% Maximal Assist   10 - 14 CL 60 - 80% Moderate Assist   15 - 19 CK 40 - 60% Moderate Assist   20 - 22 CJ 20 - 40% Minimal Assist   23 CI 1-20% SBA / CGA   24 CH 0% Independent/ Mod I     Patient left supine with all lines intact, call button in reach, nurse notified and family and nurse present    ASSESSMENT:  Sandeep Wolfe is a 86 y.o. male with a medical diagnosis of Acute respiratory failure with hypoxia and presents with poor progress with OOB activity.  Poor SPO2 saturation and difficulty with maintaining >88% with hi-flow.  Continue with OT POc.    Rehab identified problem list/impairments: Rehab identified problem list/impairments: weakness, impaired functional mobilty, impaired endurance, impaired cardiopulmonary response to activity, impaired self care skills    Rehab potential is good.    Activity tolerance: Poor    Discharge recommendations: Discharge Facility/Level Of Care Needs:  (TBD)     Barriers to discharge: Barriers to Discharge: None    Equipment recommendations:  (TBD)     GOALS:   Occupational Therapy Goals        Problem: Occupational Therapy Goal    Goal Priority Disciplines Outcome Interventions   Occupational Therapy Goal     OT,  PT/OT Ongoing (interventions implemented as appropriate)    Description:  Goals to be met by: 5/12/2017    Patient will increase functional independence with ADLs by performing: with O2 saturation>90%    UE Dressing with Set-up Assistance.  LE Dressing with Minimal Assistance.  Grooming while seated with Set-up Assistance.  Toileting from bedside commode with Stand-by Assistance for hygiene and clothing management.   Sitting at edge of bed 20 minutes with Modified [place].  Supine to sit with Modified [place].  Stand pivot transfers with Supervision.  Toilet transfer to bedside commode with Supervision.  Upper extremity exercise program x5 reps per handout, with supervision.                Plan:  Patient to be seen 5 x/week to address the above listed problems via self-care/home management, therapeutic activities, therapeutic exercises  Plan of Care expires: 06/03/17  Plan of Care reviewed with: patient, spouse, daughter         Keyona Bradford, OT  05/04/2017

## 2017-05-04 NOTE — SIGNIFICANT EVENT
Spoke with family and they have all decided to make pt a DNR status and would not want mechanical ventilation if his resp status continues to worsen. Forms signed and orders placed in chart. Cont current level of care for now.

## 2017-05-04 NOTE — PLAN OF CARE
Problem: Occupational Therapy Goal  Goal: Occupational Therapy Goal  Goals to be met by: 5/12/2017    Patient will increase functional independence with ADLs by performing: with O2 saturation>90%    UE Dressing with Set-up Assistance.  LE Dressing with Minimal Assistance.  Grooming while seated with Set-up Assistance.  Toileting from bedside commode with Stand-by Assistance for hygiene and clothing management.   Sitting at edge of bed 20 minutes with Modified [place].  Supine to sit with Modified [place].  Stand pivot transfers with Supervision.  Toilet transfer to bedside commode with Supervision.  Upper extremity exercise program x5 reps per handout, with supervision.   Outcome: Ongoing (interventions implemented as appropriate)  Pt. with decreased respiratory status today per nurse.  Okayed bed level OT. Pt. Remains on Hi-Flow O2 during the day and BIPAP at night and having difficulty maintaining SPO2 stauration above 88% per nurse's report.  Pt. performed groomng with Supervion to brush teeth with SPO2 levels ranging from 84%-94% using pacing tech with min vc; quick O2 recovery;washed face with Supervision. Pt. performed 5 reps single arm raises, bicep curls and finger flex/ext using exhale on exertion tech-having pt to count aloud for exhalation tech with good results and used pacing tech, resting after every 5 rep. Pt. maintained SPO2 saturation levels greater than 90%. Family and pt instructed to do AROM ex 1-2 daily.  Continue with OT POC

## 2017-05-04 NOTE — PROGRESS NOTES
Vancomycin dosing monitoring  Pt wt 82.6 kg  Crcl 48.5 ml/min  Dx pneumonia   Vancomycin 1250 mg ivpb q12h renal dose adjusted to   Vancomycin 1250 mg ivpb q24h per P&T approved pharmacy protocol  Vancomycin trough prior to third dose @ 1200  05/06/17

## 2017-05-04 NOTE — SUBJECTIVE & OBJECTIVE
Interval History: Does not feel improved respiratory-wise.     Review of Systems   Constitutional: Negative for chills and fever.   Respiratory: Positive for shortness of breath. Negative for cough.    Cardiovascular: Negative for chest pain and palpitations.     Objective:     Vital Signs (Most Recent):  Temp: 97.6 °F (36.4 °C) (05/04/17 1539)  Pulse: 88 (05/04/17 1700)  Resp: (!) 29 (05/04/17 1700)  BP: 130/61 (05/04/17 1700)  SpO2: (!) 89 % (05/04/17 1700) Vital Signs (24h Range):  Temp:  [97.4 °F (36.3 °C)-98.1 °F (36.7 °C)] 97.6 °F (36.4 °C)  Pulse:  [] 88  Resp:  [14-30] 29  SpO2:  [84 %-99 %] 89 %  BP: (113-141)/(58-72) 130/61     Weight: 82.6 kg (182 lb 1.6 oz)  Body mass index is 24.7 kg/(m^2).    Intake/Output Summary (Last 24 hours) at 05/04/17 1813  Last data filed at 05/04/17 1700   Gross per 24 hour   Intake             1050 ml   Output             4480 ml   Net            -3430 ml      Physical Exam   Constitutional: He is oriented to person, place, and time. He appears well-developed. No distress.   Cardiovascular: Normal rate and regular rhythm.    Pulmonary/Chest: Effort normal. He has no wheezes.   Abdominal: Soft. There is no tenderness.   Neurological: He is alert and oriented to person, place, and time.   Psychiatric: He has a normal mood and affect.   Nursing note and vitals reviewed.      Significant Labs: All pertinent labs within the past 24 hours have been reviewed.    Significant Imaging: I have reviewed all pertinent imaging results/findings within the past 24 hours.

## 2017-05-04 NOTE — PT/OT/SLP PROGRESS
Physical Therapy  Treatment    Sandeep Wolfe   MRN: [id number]   Admitting Diagnosis: Acute respiratory failure with hypoxia    PT Received On: 17  PT Start Time: 1420     PT Stop Time: 1443    PT Total Time (min): 23 min       Billable Minutes:  Therapeutic Exercise 23    Treatment Type: Treatment  PT/PTA: PT             General Precautions: Standard, fall (Desaturates very quickly)  Orthopedic Precautions: N/A   Braces:      Do you have any cultural, spiritual, Denominational conflicts, given your current situation?: none    Subjective:  Communicated with nurse prior to session.  Pt's wife and daughter were present.    Pain Ratin/10                   Objective:   Patient found with: arterial line, peripheral IV, SCD, telemetry, oxygen    Functional Mobility:  Bed Mobility:   Rolling/Turning to Left: Minimum assistance  Rolling/Turning Right: Minimum assistance  Supine to Sit:  (Not allowed by Nursing today secondary to low O2 Sats with minimal activiity quickl)    Transfers:       Gait:        Stairs:  NT    Therapeutic Activities and Exercises:  Pt performed 10 x 2 hip/knee flexion/ext, hip abd/add, SAQ and AP bilaterally with significant rest breaks between each set.    AM-PAC 6 CLICK MOBILITY  How much help from another person does this patient currently need?   1 = Unable, Total/Dependent Assistance  2 = A lot, Maximum/Moderate Assistance  3 = A little, Minimum/Contact Guard/Supervision  4 = None, Modified [place]/Independent    Turning over in bed (including adjusting bedclothes, sheets and blankets)?: 3  Sitting down on and standing up from a chair with arms (e.g., wheelchair, bedside commode, etc.): 3  Moving from lying on back to sitting on the side of the bed?: 3  Moving to and from a bed to a chair (including a wheelchair)?: 3  Need to walk in hospital room?: 3  Climbing 3-5 steps with a railing?: 2  Total Score: 17    AM-PAC Raw Score CMS G-Code Modifier Level of Impairment Assistance   6 CN  100% Total / Unable   7 - 9 CM 80 - 100% Maximal Assist   10 - 14 CL 60 - 80% Moderate Assist   15 - 19 CK 40 - 60% Moderate Assist   20 - 22 CJ 20 - 40% Minimal Assist   23 CI 1-20% SBA / CGA   24 CH 0% Independent/ Mod I     Patient left HOB elevated with all lines intact, call button in reach, nurse notified and family present.    Assessment:  Sandeep Wolfe is a 86 y.o. male with a medical diagnosis of Acute respiratory failure with hypoxia and presents with decreased tolerance to activity secondary to quickly desaturating with all activity.    Rehab identified problem list/impairments: Rehab identified problem list/impairments: weakness, gait instability, impaired cardiopulmonary response to activity, impaired endurance, impaired balance, decreased lower extremity function, decreased safety awareness, impaired self care skills, impaired functional mobilty    Rehab potential is fair.    Activity tolerance: Fair    Discharge recommendations: Discharge Facility/Level Of Care Needs:  (to be determined)     Barriers to discharge:  medical status    Equipment recommendations: Equipment Needed After Discharge:  (to be determined)     GOALS:   Physical Therapy Goals        Problem: Physical Therapy Goal    Goal Priority Disciplines Outcome Goal Variances Interventions   Physical Therapy Goal     PT/OT, PT Ongoing (interventions implemented as appropriate)     Description:  Goals to be met by: 2017     Patient will increase functional independence with mobility by performin. Supine to sit to be assessed   2. Sit to stand transfer to be assessed  3. Bed to chair transfer to be assessed   4. Gait  To be assessed .   5. Sitting at edge of bed to be assessed  6 Lower extremity exercise program x 10-15 reps per handout, with supervision             Problem: Physical Therapy Goal    Goal Priority Disciplines Outcome Goal Variances Interventions   Physical Therapy Goal     PT/OT, PT                PLAN:     Patient to be seen 6 x/week  to address the above listed problems via gait training, therapeutic activities, therapeutic exercises, neuromuscular re-education  Plan of Care expires: 06/04/17  Plan of Care reviewed with: patient, daughter, spouse         Chloe Amaro, PT  05/04/2017

## 2017-05-04 NOTE — ASSESSMENT & PLAN NOTE
Pneumonia of right lung due to infectious organism  Acute diastolic heart failure  Had echo done 4/27/17 that showed normal LVEF with diastolic dysfunction and PASP of 35. Appreciate Pulmonology. Continue antibiotics. Continue furosemide. On prednisone 60 mg daily. Wean high flow nasal cannula as tolerated.

## 2017-05-04 NOTE — PLAN OF CARE
Problem: Physical Therapy Goal  Goal: Physical Therapy Goal  Goals to be met by: 2017     Patient will increase functional independence with mobility by performin. Supine to sit to be assessed   2. Sit to stand transfer to be assessed  3. Bed to chair transfer to be assessed   4. Gait To be assessed .   5. Sitting at edge of bed to be assessed  6 Lower extremity exercise program x 10-15 reps per handout, with supervision   Outcome: Ongoing (interventions implemented as appropriate)  Unable to address mobility assessment yet because of Nursing advising no sitting secondary to patient desaturation quickly with all activities.  Patient will benefit from PT for strengthening and functional mobility as tolerated.

## 2017-05-04 NOTE — PLAN OF CARE
Pt remains in ICU on high flow oxygen and BIPAP at night; on  IV antibiotics for HAP;  Per [facility] note, MD spoke to pt about advanced mechanical ventilation today. Given his pulmonary status and possibility of underlying ILD, it will be hard for him to liberate from the ventilator. He would like us to talk to his family about this.   TN to continue to follow and assit with discharge needs once determined.     05/04/17 1200   Discharge Reassessment   Assessment Type Discharge Planning Reassessment   Can the patient answer the patient profile reliably? Yes, cognitively intact   How does the patient rate their overall health at the present time? Fair   Describe the patient's ability to walk at the present time. Major restrictions/daily assistance from another person   How often would a person be available to care for the patient? Whenever needed   Number of comorbid conditions (as recorded on the chart) Two   During the past month, has the patient often been bothered by feeling down, depressed or hopeless? No   During the past month, has the patient often been bothered by little interest or pleasure in doing things? No   Discharge plan remains the same: Yes   Provided patient/caregiver education on the expected discharge date and the discharge plan Yes   Discharge Plan A Home with family;Home Health   Discharge Plan B Skilled Nursing Facility   Change in patient condition or support system Yes   Explained to the the patient/caregiver why the discharge planned changed: Yes   Involved the patient/caregiver in establishing a new discharge plan: Yes

## 2017-05-04 NOTE — PLAN OF CARE
Called Respiratory due to pt no longer maintaining sats >88% on hi flow despite increasing FiO2. Pt was sleeping but when awaken it will increase to low 90s. Pt was placed on Bipap and currently 91%. Will continue to monitor.

## 2017-05-04 NOTE — PROGRESS NOTES
[facility]  Critical Care Medicine  Progress Note    Patient Name: Sandeep Wolfe  MRN: [id number]  Admission Date: 5/1/2017  Hospital Length of Stay: 3 days  Code Status: Full Code  Attending Provider: Cody Jay MD  Primary Care Provider: Juan José Berumen MD   Principal Problem: Acute respiratory failure with hypoxia    Subjective:   Worse today and having some moderate resp distress. Unable to eat breakfast this morning because he is unable to chew his food. FiO2 requirements as high as a 100% overnight.   Review of Systems   See above  Objective:     Vital Signs (Most Recent):  Temp: 97.8 °F (36.6 °C) (05/04/17 0700)  Pulse: 83 (05/04/17 0800)  Resp: (!) 29 (05/04/17 0800)  BP: (!) 141/69 (05/04/17 0800)  SpO2: (!) 90 % (05/04/17 0800) Vital Signs (24h Range):  Temp:  [97.4 °F (36.3 °C)-98.9 °F (37.2 °C)] 97.8 °F (36.6 °C)  Pulse:  [68-97] 83  Resp:  [16-30] 29  SpO2:  [83 %-96 %] 90 %  BP: (114-150)/(59-77) 141/69     Weight: 60.6 kg (133 lb 9.6 oz)  Body mass index is 18.12 kg/(m^2).      Intake/Output Summary (Last 24 hours) at 05/04/17 0820  Last data filed at 05/04/17 0800   Gross per 24 hour   Intake             1170 ml   Output             3005 ml   Net            -1835 ml       Physical Exam  Constitutional: He is oriented to person, place, and time. He appears well-developed and well-nourished. On HFNC. In moderate resp distress.  Head: Normocephalic.   Eyes: EOM are normal. Pupils are equal, round, and reactive to light.   Neck: Normal range of motion.   Cardiovascular: Normal rate.   Pulmonary/Chest: Effort normal. Bibasilar crackles  Musculoskeletal: Normal range of motion. He exhibits no edema.   Neurological: He is alert and oriented to person, place, and time.   Skin: Skin is warm and dry.          Lines/Drains/Airways     Drain                 Urethral Catheter 05/01/17 1716 Latex 14 Fr. 2 days          Peripheral Intravenous Line                 Peripheral IV - Single  Lumen 05/01/17 1630 Left;Anterior Hand 2 days         Peripheral IV - Single Lumen 05/02/17 Right Forearm 2 days                Significant Labs:    CBC/Anemia Profile:    Recent Labs  Lab 05/02/17  0832 05/03/17  0355 05/04/17  0300   WBC 30.20* 27.24* 27.37*   HGB 12.9* 11.3* 11.1*   HCT 36.3* 31.9* 31.7*   * 367* 377*   MCV 90 88 89   RDW 12.0 11.9 12.1        Chemistries:    Recent Labs  Lab 05/03/17  0355 05/04/17  0300   * 127*   K 3.3* 3.6   CL 88* 88*   CO2 26 26   BUN 27* 25*   CREATININE 1.0 1.2   CALCIUM 8.0* 7.9*   ALBUMIN 2.5* 2.4*   PROT 6.0 5.8*   BILITOT 0.8 1.0   ALKPHOS 139* 158*   * 126*   AST 55* 43*   MG 2.0 1.8   PHOS 3.8 3.3       Assessment/Plan:   1. Acute hypoxemic resp failure  2. HFpEF  3. HTN  4. CAP/HAP  5. Hyponatremia  6. Leukocytosis        Neurology:  -AAO3, no issues     Cardiovascular:  Hemodynamics  -Stable     Rhythm  -NSR     HFpEF  -Cont diuresis-Increased diuresis again today in light of his worsening hypoxemia     Respiratory:  -Acute hypoxemic resp failure  -Cont BiPAP PRN and HFNC during the day when awake  -On gram neg coverage for HAP. Would treat with abs for 7 days given that he has not improved with diuretics.  -Spoke to pt about advanced mechanical ventilation today. Given his pulmonary status and possibility of underlying ILD, it will be hard for him to liberate from the ventilator. He would like us to talk to his family about this.        -? UIP pattern on CT  -Not typical UIP pattern on CT. Lots of GGO that is not consistent with UIP  -Will increase steroids today to 60 mg.   -Autoimmune studies neg. Check RF and CCP      Renal/Electrolytes/  Hyponatremia  -From SIADH, Na improving. Put on 1 L fluid restriction.   -BUN and Cr are both stable     GI:  -no issues.      ID:  - Cont azithro and pip-tazo for now. Added vanc back today. Will treat for a total of 8 days with all abs.     Hematology  -H/H stable  -Platelet counts stable  -WBC count  down trending.      Endo:  -TSH normal     Prevention:  -DVT prophylaxis  -Keep head of bed elevated  -PT/OT  -addressed code status today. Will discuss at length with family members today.     Nadine Mcallister MD  Critical Care Medicine  [facility]      Addendum: Spoke with daughter about intubation and code status. Since we expressed that liberation from the ventilator will  be difficult given underlying suspected ILD, they feel that MV should be avoided if it came to that point. However, before they make a definitive decision, they would like to talk as a family.

## 2017-05-05 PROBLEM — E87.6 HYPOKALEMIA: Status: ACTIVE | Noted: 2017-01-01

## 2017-05-05 NOTE — PROGRESS NOTES
[facility]  Critical Care Medicine  Progress Note    Patient Name: Sandeep Wolfe  MRN: [id number]  Admission Date: 5/1/2017  Hospital Length of Stay: 4 days  Code Status: DNR  Attending Provider: Cody Jay MD  Primary Care Provider: Juan José Berumen MD   Principal Problem: Acute respiratory failure with hypoxia    Subjective:   Cont to have high FiO2 requirement. The patient is resting in bed. Tolerating oral diet. Now DNR status. Na levels remain in the 120's. Culture data thus far is all neg.   Review of Systems   See above  Objective:     Vital Signs (Most Recent):  Temp: 98.2 °F (36.8 °C) (05/05/17 0309)  Pulse: 87 (05/05/17 0743)  Resp: (!) 28 (05/05/17 0743)  BP: 129/63 (05/05/17 0600)  SpO2: (!) 94 % (05/05/17 0743) Vital Signs (24h Range):  Temp:  [97.6 °F (36.4 °C)-98.3 °F (36.8 °C)] 98.2 °F (36.8 °C)  Pulse:  [] 87  Resp:  [14-33] 28  SpO2:  [85 %-99 %] 94 %  BP: (106-138)/(57-72) 129/63     Weight: 82.6 kg (182 lb 1.6 oz)  Body mass index is 24.7 kg/(m^2).      Intake/Output Summary (Last 24 hours) at 05/05/17 0834  Last data filed at 05/05/17 0600   Gross per 24 hour   Intake              860 ml   Output             4705 ml   Net            -3845 ml       Physical Exam  Constitutional: He is oriented to person, place, and time. He appears well-developed and well-nourished. On HFNC.  Head: Normocephalic.   Eyes: EOM are normal. Pupils are equal, round, and reactive to light.   Neck: Normal range of motion.   Cardiovascular: Normal rate.   Pulmonary/Chest: Effort normal. Bibasilar crackles  Musculoskeletal: Normal range of motion. He exhibits no edema.   Neurological: He is alert and oriented to person, place, and time.   Skin: Skin is warm and dry.          Lines/Drains/Airways     Drain                 Urethral Catheter 05/01/17 1716 Latex 14 Fr. 3 days          Peripheral Intravenous Line                 Peripheral IV - Single Lumen 05/01/17 1630 Left;Anterior Hand 3  days         Peripheral IV - Single Lumen 05/02/17 Right Forearm 3 days                Significant Labs:    CBC/Anemia Profile:    Recent Labs  Lab 05/04/17  0300 05/05/17  0355   WBC 27.37* 24.61*   HGB 11.1* 10.8*   HCT 31.7* 30.4*   * 304   MCV 89 88   RDW 12.1 12.0        Chemistries:    Recent Labs  Lab 05/04/17  0300 05/04/17  1504 05/05/17  0355   * 122* 125*   K 3.6 3.7 3.3*   CL 88* 84* 85*   CO2 26 25 28   BUN 25* 28* 27*   CREATININE 1.2 1.3 1.3   CALCIUM 7.9* 7.9* 7.7*   ALBUMIN 2.4*  --  2.4*   PROT 5.8*  --  5.6*   BILITOT 1.0  --  0.9   ALKPHOS 158*  --  161*   *  --  133*   AST 43*  --  42*   MG 1.8 1.9 1.9   PHOS 3.3  --  4.5       Assessment/Plan:   1. 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 HFpEF  -Cont gentle diuresis  3. HTN  -Maintain normotension  4. CAP/HAP  -abs for 7 days total  5. Hyponatremia  -Fluid restriction  6. Leukocytosis        Neurology:  -AAO3, no issues     Cardiovascular:  Hemodynamics  -Stable     Rhythm  -NSR     HFpEF  -Cont diuresis-Decreased lasix to 40 mg IV BID    Respiratory:  -Acute hypoxemic resp failure  -Cont BiPAP PRN and HFNC during the day when awake  -On gram neg coverage for HAP and coverage for gram positive organisms. Would treat with abs for 7 days given that he has not improved with diuretics.  -Pt and family do not want mechanical ventilation. If he has clinical worsening, will need to transition to comfort measures only.      -? UIP pattern on CT  -Not typical UIP pattern on CT. Lot of GGO that is not consistent with UIP  -Steroids increased to 60 mg daily. Cont this dose for now.  -Autoimmune studies neg.      Renal/Electrolytes/  Hyponatremia  -From SIADH, Na improving. Put on 1 L fluid restriction.   -BUN and Cr--slight increase today. Decreasing diuresis today.     GI:  -no issues.      ID:  - Cont azithro and pip-tazo for now. Added vanc back 5/4. Will treat for a total of 8 days with all abs. Completion  date 5/8/17    Hematology  -H/H stable  -Platelet counts stable  -WBC count down trending.      Endo:  -TSH normal     Prevention:  -DVT prophylaxis  -Keep head of bed elevated  -PT/OT  -Code status: DNR    Dispo: If pt continues to require HFO over the weekend, anticipate that he will need to be transitioned to an LTAC facility for O2 weaning. Will monitor over the weekend.     Nadine Mcallister MD  Critical Care Medicine  [facility]

## 2017-05-05 NOTE — PLAN OF CARE
Problem: Patient Care Overview  Goal: Plan of Care Review  Outcome: Ongoing (interventions implemented as appropriate)  Mr. Wolfe, will call for assistance when wanting to use bedpan or get up to bedside commode, call light within reach, bed in low position,skid resistance socks on, wheels locked, he will be offered assistance every hour. He will be assistance to shift his weight; heels floated off the mattress; to prevent pressure areas to the skin. Pain will be monitored hourly and addressed with pain medication as needed. Mobility is limited due to shortness of breath; his mobility will be adjusted to his ability.  ABCEDF bundle: oxygen per comfort flow with nasal canula; saturation is 88-91%; with recovery prolonged, dyspneic on minimal exertion; breath sounds coarse with crackles, respiratory treatments will be continued; alert, oriented, follows commands and moves all extremities; daughter and spouse at the bedside, attentive and supportive; plan is to have evaluation for LT A C facility.

## 2017-05-05 NOTE — PLAN OF CARE
Problem: Patient Care Overview  Goal: Plan of Care Review  Outcome: Ongoing (interventions implemented as appropriate)  Today, Mr. Wolfe has done better as the day has progressed. We diuresed 3L today and has fluid has diuresed out he has required slightly less to keep his SaO2 up and he tolerated some PT and OT exercises in bed with quick recovery after short bursts of work. K and Mag have remained good despite diureses.

## 2017-05-05 NOTE — PROGRESS NOTES
[facility] Hospital Medicine  Progress Note    Patient Name: Sandeep Wolfe  MRN: [id number]  Patient Class: IP- Inpatient   Admission Date: 5/1/2017  Length of Stay: 4 days  Attending Physician: Cody Jay MD  Primary Care Provider: Juan José Berumen MD        Subjective:     Principal Problem:Acute respiratory failure with hypoxia    HPI:  Sandeep Wolfe is a 86 y.o. white man with hypertension, osteoarthritis with chronic left hip pain, dyslipidemia, benign prostatic hyperplasia status post transurethral prostatectomy on 7/11/14, anemia, and remote cigarette smoking history. He lives in [place], [place]. He is . His primary care physician is Dr. Juan José Berumen.    He developed a sore throat on Tuesday 4/18/17. The next day he had subjective fever and night sweats. On 4/23/17 he was seen at an urgent care clinic, was given a steroid injection and prescribed a course of azithromycin. He appeared better on 4/25/17 and an appointment with Dr. Berumen was cancelled. On 4/26/17 he was taken to [facility] for fatigue, shortness of breath, cough, and decreased oral intake. He was found to have hyponatremia (sodium 116), elevated BNP (559), and right upper and lower lobe infiltrates. He was hypoxic with oxygen saturation in the 80s. He was admitted and put on ceftriaxone and azithromycin. 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 Echocardiogram showed diastolic dysfunction. Cardiology saw him and felt there was no evidence of heart failure. Antibiotics were changed to meropenem and vancomycin. Chest CTA was done showing no pulmonary embolism, but showed subpleural cysts read as possible UIP. He was started on methylprednisolone on 4/28/17. Hypoxia worsened. Pulmonology saw him and put him on BiPAP.     He was transferred to [facility] on 5/01/17 for evaluation by a different pulmonologist.      Hospital Course:  He was started on empiric furosemide 40 mg IV TID by  Pulmonology, and steroids were weaned. He was put on piperacillin-tazobactam, azithromycin, and vancomycin. By the next morning his fluid status was net negative 3550 mL. He was switched to high flow nasal cannula. He was also constipated due to spending several days in the hospital bed. He was started on stool softeners and Physical/Occupational Therapy. He had significant dyspnea with movement but was able to be moved from the bed to the beside commode on 5/03/17, allowing time afterward for recovery. BENJAMIN, anti-double stranded DNA, anti-SSA, anti-SSB, ANCA, and HIV antibodies were negative. Vancomycin was stopped. On 5/04/17 he had worse hypoxia, despite good diuresis. Chest x-ray appeared unchanged. On 5/05/17 he had a bowel movement and was tachycardic up to 130s for about an hour. By this time his fluid status was net negative 11 liters, but he was not lightheaded. Furosemide was continued at a lower dose.     Interval History: Does not feel improved respiratory-wise.     Review of Systems   Constitutional: Negative for chills and fever.   Respiratory: Positive for shortness of breath. Negative for cough.    Cardiovascular: Negative for chest pain and palpitations.     Objective:     Vital Signs (Most Recent):  Temp: 97.6 °F (36.4 °C) (05/04/17 1539)  Pulse: 88 (05/04/17 1700)  Resp: (!) 29 (05/04/17 1700)  BP: 130/61 (05/04/17 1700)  SpO2: (!) 89 % (05/04/17 1700) Vital Signs (24h Range):  Temp:  [97.4 °F (36.3 °C)-98.1 °F (36.7 °C)] 97.6 °F (36.4 °C)  Pulse:  [] 88  Resp:  [14-30] 29  SpO2:  [84 %-99 %] 89 %  BP: (113-141)/(58-72) 130/61     Weight: 82.6 kg (182 lb 1.6 oz)  Body mass index is 24.7 kg/(m^2).    Intake/Output Summary (Last 24 hours) at 05/04/17 1813  Last data filed at 05/04/17 1700   Gross per 24 hour   Intake             1050 ml   Output             4480 ml   Net            -3430 ml      Physical Exam   Constitutional: He is oriented to person, place, and time. He appears  well-developed. No distress.   Cardiovascular: Normal rate and regular rhythm.    Pulmonary/Chest: Effort normal. He has no wheezes.   Abdominal: Soft. There is no tenderness.   Neurological: He is alert and oriented to person, place, and time.   Psychiatric: He has a normal mood and affect.   Nursing note and vitals reviewed.      Significant Labs: All pertinent labs within the past 24 hours have been reviewed.    Significant Imaging: I have reviewed all pertinent imaging results/findings within the past 24 hours.       Assessment/Plan:      * Acute respiratory failure with hypoxia  Pneumonia of right lung due to infectious organism  Acute diastolic heart failure  Had echo done 4/27/17 that showed normal LVEF with diastolic dysfunction and PASP of 35. Appreciate Pulmonology. Continue antibiotics. Continue furosemide. On prednisone 60 mg daily. Wean high flow nasal cannula as tolerated.         Hyponatremia  Likely SIADH. Sodium 125 today.      Essential hypertension  Continue home metoprolol 25 mg BID and amlodipine 10 mg daily. Giving losartan at 25 mg daily which is down from 100 mg daily home dose. Blood pressure controlled.       Hypokalemia  Replete.      VTE Risk Mitigation         Ordered     heparin (porcine) injection 5,000 Units  Every 12 hours     Route:  Subcutaneous        05/01/17 1729     Medium Risk of VTE  Once      05/01/17 1328     Place BRIAN hose  Until discontinued      05/01/17 1328     Place sequential compression device  Until discontinued      05/01/17 1328          Cody Jay MD  Department of Hospital Medicine   [facility]

## 2017-05-05 NOTE — PROGRESS NOTES
NNP called about am  Potassium , replacement requested. New orders noted.  0632 PO Potassium given.

## 2017-05-05 NOTE — PROGRESS NOTES
"Received care from off going nurse, Donavan. Noted patient is AAOX4 with High Flow in place. POC explained to patient verbalized understanding. All detailed assessment per flow record. PIV infusing without any complications noted.  2110 PM meds given, pt is cooperative, Lopressor held, secondary to B/P 110/58 will continue to monitor.  0010 Patient called out with c/o feeling wet from boucher being "blocked". Assessment of Boucher cath, appears to be in place and filled with the proper amount of sterile water. Clean draw sheet and pads replaced. CHD wipes used, noted patient does not have urine on  Him just a little moist between his legs.Patient tolerated well.    "

## 2017-05-05 NOTE — PLAN OF CARE
Problem: Breathing Pattern Ineffective (Adult)  Goal: Effective Oxygenation/Ventilation  Patient will demonstrate the desired outcomes by discharge/transition of care.   Outcome: Ongoing (interventions implemented as appropriate)  Adequate Oxygenation will continue to be monitored and assessed.

## 2017-05-05 NOTE — PT/OT/SLP PROGRESS
Occupational Therapy      Sandeep Wolfe  MRN: [id number]    Patient not seen today secondary to  (nurse informed OT that pt. desatted to 70's and took 1 hr to recover  after bed pan use; deferred OT today.  Pt resting in bed with SPO2 86% on HFNC.  ). Will follow-up .    Keyona Bradford OT  5/5/2017

## 2017-05-06 NOTE — PROGRESS NOTES
Patient  Resting  But still awake. He is starting to complaint about the noise that is coming from his Comfort Flow. Patient reassured that everything is ok, and that is the usual sound of the Comfort Flow.  0615 Tristan Cath emptied, patient is still awake but with intermittent opening his eyes during care.  0620 Patient monitor is starting to alarm at the desk.  I ran into his room and noted that he was not breathing and no movement noted. Patient is not moving and cool to touch. Charge Nurse notified at bs, with assessment. NNP called and informed on the patient assessment, she in turn called the ER Physician to notify  Them of the patient assessment.  0724 MD Mcallister here at the bs with announcing TOD. MD called the family to inform them of the patient TOD.  0700 Report given to oncoming nurseEsteban.

## 2017-05-06 NOTE — PROGRESS NOTES
Notified GABRIELA of pt death spoke with screener Shiela Braga and Document Number [id number]    @0725  pronounced pt MD [place]

## 2017-05-06 NOTE — SIGNIFICANT EVENT
Was informed by the nurse that pt had a good night, used his BiPAP and then took it off to read his book. At about 6:00 am he took off his HFO and then became rom cardic. This progressed to asystole.   Upon arrival to the bedside, no spontaneous respirations noted, pupils fixed and dilated and no heart beat auscultated. TOD 7:25 am. Cause of death: hypoxemic resp failure. Called all available phone numbers to inform family. No answer.     Nadine Mcallister MD  PCCM Fellow

## 2017-05-06 NOTE — PLAN OF CARE
GAURANG Lambert notified of pt's passing. States she will call ED doc to come pronounce patient.  Lili Schmitz RN

## 2017-05-06 NOTE — PROVIDER PROGRESS NOTES - EMERGENCY DEPT.
Encounter Date: 4/26/2017    ED Physician Progress Notes        Physician Note:   Time of death 0641  Called to ICU for pronouncement.  Pt apneic, asystolic in 2 leads.  Absent heart/breath sounds. GCS3.

## 2017-05-06 NOTE — DISCHARGE SUMMARY
[facility] Hospital Medicine  Discharge Summary      Patient Name: Sandeep Wolfe  MRN: [id number]  Admission Date: 5/1/2017  Hospital Length of Stay: 5 days  Discharge Date and Time: 05/06/2017 7:25 AM  Attending Physician: Cody Jay MD   Discharging Provider: Cody Jay MD  Primary Care Provider: Juan José Berumen MD      HPI:   Sandeep Wolfe is a 86 y.o. white man with hypertension, osteoarthritis with chronic left hip pain, dyslipidemia, benign prostatic hyperplasia status post transurethral prostatectomy on 7/11/14, anemia, and remote cigarette smoking history. He lives in [place], [place]. He is . His primary care physician is Dr. Juan José Berumen.    He developed a sore throat on Tuesday 4/18/17. The next day he had subjective fever and night sweats. On 4/23/17 he was seen at an urgent care clinic, was given a steroid injection and prescribed a course of azithromycin. He appeared better on 4/25/17 and an appointment with Dr. Berumen was cancelled. On 4/26/17 he was taken to [facility] for fatigue, shortness of breath, cough, and decreased oral intake. He was found to have hyponatremia (sodium 116), elevated BNP (559), and right upper and lower lobe infiltrates. He was hypoxic with oxygen saturation in the 80s. He was admitted and put on ceftriaxone and azithromycin. 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 Echocardiogram showed diastolic dysfunction. Cardiology saw him and felt there was no evidence of heart failure. Antibiotics were changed to meropenem and vancomycin. Chest CTA was done showing no pulmonary embolism, but showed subpleural cysts read as possible UIP. He was started on methylprednisolone on 4/28/17. Hypoxia worsened. Pulmonology saw him and put him on BiPAP.     He was transferred to [facility] on 5/01/17 for evaluation by a different pulmonologist.       Indwelling Lines/Drains at time of discharge:   Lines/Drains/Airways     Drain                  Urethral Catheter 17 1716 Latex 14 Fr. 4 days              Hospital Course:   He was started on empiric furosemide 40 mg IV TID by Pulmonology, and steroids were weaned. He was put on piperacillin-tazobactam, azithromycin, and vancomycin. By the next morning his fluid status was net negative 3550 mL. He was switched to high flow nasal cannula. He was also constipated due to spending several days in the hospital bed. He was started on stool softeners and Physical/Occupational Therapy. He had significant dyspnea with movement but was able to be moved from the bed to the beside commode on 17, allowing time afterward for recovery. BENJAMIN, anti-double stranded DNA, anti-SSA, anti-SSB, ANCA, and HIV antibodies were negative. Vancomycin was stopped. On 17 he had worse hypoxia, despite good diuresis. Chest x-ray appeared unchanged. On 17 he had a bowel movement and was tachycardic up to 130s for about an hour. By this time his fluid status was net negative 11 liters, but he was not lightheaded. Furosemide was continued at a lower dose. He had a good night between 17 and 17. He used his BiPAP and then took it off to read his book. At about 6:00 am on the morning of 17 he took off his high flow nasal cannula and then became bradycardic. This progressed to asystole. He had previously stated he was DNR. He  and was pronounced at 7:25 am.    Cause of Death: Acute hypoxemic respiratory failure       Consults:   Consults         Status Ordering Provider     Inpatient consult to Pulmonology  Once     Provider:  Amie Burgos MD    Completed LINDA POLANCO          Significant Diagnostic Studies:   X-ray chest AP portable 17: Extensive detrimental change with increased bilateral interstitial lung markings which is nonspecific but may be seen with pulmonary edema, multifocal pneumonia, or ARDS.  No cardiomegaly.  Calcification of the aortic arch.  Trace bilateral  pleural effusions.  Osseous structures demonstrate degenerative changes.  X-Ray Chest 1 View 17: Slight decrease in the diffuse left lung interstitial and ill-defined airspace opacities. Slight decrease in the right lower lung zone interstitial and airspace opacities, but slight increase in the right upper lung zone opacities. No pneumothorax. Slight increase in small right pleural effusion. No displaced fracture.  X-Ray Chest 1 View 17: reticular lung markings seen diffusely, unchanged.  More confluent opacity right upper lobe, similar to the prior study.  No pleural effusion seen.  No pneumothorax.  The cardiac silhouette is midline.    Final Active Diagnoses:    Diagnosis Date Noted POA    PRINCIPAL PROBLEM:  Acute respiratory failure with hypoxia [J96.01] 2017 Yes    Hypokalemia [E87.6] 2017 Yes    Leukocytosis [D72.829] 2017 Yes    Anemia of chronic disease [D63.8] 2017 Yes    Abnormal liver enzymes [R74.8] 2017 Yes    Essential hypertension [I10] 2017 Yes     Chronic    Pneumonia of right lung due to infectious organism [J18.9] 2017 Yes    Acute diastolic heart failure [I50.31] 2017 Yes    Hyponatremia [E87.1] 2017 Yes      Problems Resolved During this Admission:    Diagnosis Date Noted Date Resolved POA      Discharged Condition:     Disposition:  at medical facility    Patient Instructions:   No discharge procedures on file.  Medications:  None (patient  at medical facility)  Time spent on the discharge of patient: 10 minutes    Cody Jay MD  Department of Hospital Medicine  [facility]

## 2017-05-06 NOTE — PROGRESS NOTES
Received report from off going nurse, Patient is AAOX4 noted he is having a hard time  Recovering. His O2 Sats are in the 80's with Comfort flow NC in place at 100%/ 15. POC discussed with patient verbalized understanding. All detailed assessment per flow record. Patient informed on if his Sats does not come back up, he will need to have BiPap in place, verbalized understanding.

## 2017-05-06 NOTE — PROGRESS NOTES
Patient verbalized he would like to read, O2 Sats are 100% on BiPap. Resp called for assistance.  2145 Resp here with Oxygen change source, patient is happy and HiFlow at 100%  Will continue to monitor closely.  2147 O2 Sats are 94%.

## 2017-05-06 NOTE — PROGRESS NOTES
Patient O2 Sats have remain in good standing, pt have requested to read his book. Bipap off and Comfort flow replaced.  0212 Patient is without any complications noted. He is going to sleep now, call bell within reach.

## 2017-07-24 NOTE — PROGRESS NOTES
[facility] Hospital Medicine  Progress Note    Patient Name: Sandeep Wolfe  MRN: [id number]  Patient Class: IP- Inpatient   Admission Date: 5/1/2017  Length of Stay: 5 days  Attending Physician: No att. providers found  Primary Care Provider: Juan José Berumen MD        Subjective:     Principal Problem:Acute respiratory failure with hypoxia    HPI:  Sandeep Wolfe is a 86 y.o. white man with hypertension, osteoarthritis with chronic left hip pain, dyslipidemia, benign prostatic hyperplasia status post transurethral prostatectomy on 7/11/14, anemia, and remote cigarette smoking history. He lives in [place], [place]. He is . His primary care physician is Dr. Juan José Berumen.    He developed a sore throat on Tuesday 4/18/17. The next day he had subjective fever and night sweats. On 4/23/17 he was seen at an urgent care clinic, was given a steroid injection and prescribed a course of azithromycin. He appeared better on 4/25/17 and an appointment with Dr. Berumen was cancelled. On 4/26/17 he was taken to [facility] for fatigue, shortness of breath, cough, and decreased oral intake. He was found to have hyponatremia (sodium 116), elevated BNP (559), and right upper and lower lobe infiltrates. He was hypoxic with oxygen saturation in the 80s. He was admitted and put on ceftriaxone and azithromycin. 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 Echocardiogram showed diastolic dysfunction. Cardiology saw him and felt there was no evidence of heart failure. Antibiotics were changed to meropenem and vancomycin. Chest CTA was done showing no pulmonary embolism, but showed subpleural cysts read as possible UIP. He was started on methylprednisolone on 4/28/17. Hypoxia worsened. Pulmonology saw him and put him on BiPAP.     He was transferred to [facility] on 5/01/17 for evaluation by a different pulmonologist.      Hospital Course:  He was started on empiric furosemide 40 mg IV TID by  Pulmonology, and steroids were weaned. He was put on piperacillin-tazobactam, azithromycin, and vancomycin. By the next morning his fluid status was net negative 3550 mL. He was switched to high flow nasal cannula. He was also constipated due to spending several days in the hospital bed. He was started on stool softeners and Physical/Occupational Therapy. He had significant dyspnea with movement but was able to be moved from the bed to the beside commode on 5/03/17, allowing time afterward for recovery. BENJAMIN, anti-double stranded DNA, anti-SSA, anti-SSB, ANCA, and HIV antibodies were negative. Vancomycin was stopped.     Interval History: Dyspnea on exertion.      Review of Systems   Constitutional: Negative for chills and fever.   Respiratory: Positive for shortness of breath. Negative for cough.    Cardiovascular: Negative for chest pain and palpitations.      Objective:      Vital Signs (Most Recent):  Temp: 97.8 °F (36.7 °C) (05/04/17 0301)  Pulse: 66 (05/04/17 0701)  Resp: (!) 25 (05/04/17 0701)  BP: 122/61 (05/04/17 0701)  SpO2: (!) 90 % (05/04/17 0701)      Weight: 82 kg (180 lb 12.4 oz)  Body mass index is 24.52 kg/(m^2).     Intake/Output Summary (Last 24 hours) at 05/04/17 0700    Gross per 24 hour   Intake              1420 ml   Output             3050 ml   Net            -5965 ml      Physical Exam   Constitutional: He is oriented to person, place, and time. He appears well-developed. No distress.   Cardiovascular: Normal rate and regular rhythm.    Pulmonary/Chest: Effort normal. He has rales.   Abdominal: Soft. There is no tenderness.   Neurological: He is alert and oriented to person, place, and time.   Psychiatric: He has a normal mood and affect.   Nursing note and vitals reviewed.    Assessment/Plan:      * Acute respiratory failure with hypoxia  Pneumonia of right lung due to infectious organism  Acute diastolic heart failure  Appreciate Pulmonology. Continue antibiotics and deescalate. Continue  furosemide. Wean high flow nasal cannula as tolerated. Had echo done 4/27/17 that showed normal LVEF with diastolic dysfunction and PASP of 35.            Hyponatremia  Likely SIADH. Sodium 122. Will continue to monitor.         Essential hypertension  Continue home metoprolol 25 mg BID and amlodipine 10 mg daily. Giving losartan at 25 mg daily which is down from 100 mg daily home dose. Blood pressure controlled.   VTE Risk Mitigation         Ordered     Medium Risk of VTE  Once      05/01/17 1328          Cody Jay MD  Department of Hospital Medicine   [facility]

## 2020-12-21 NOTE — ASSESSMENT & PLAN NOTE
Anesthesia Pre Eval Note    Anesthesia ROS/Med Hx    Overall Review:  EKG was reviewed, Echo was reviewed and Stress test was reviewed       Pulmonary Review:  History of: asthma -     Neuro/Psych Review:    Positive for headaches  Positive for psychiatric history    Cardiovascular Review:    Positive for hypertension    GI/HEPATIC/RENAL Review:    Positive for renal disease - chronic renal insufficiency    End/Other Review:    Positive for anemia  Negative for substance abuse   Additional Results:  EKG:  Encounter Date: 11/10/20  -ELECTROCARDIOGRAM 12-LEAD                                                                Narrative    Test was performed. :1961  AGE:61year old    Ordering provider: Afshin Jesus NP    Diagnosis: Pre-op evaluation  (primary encounter diagnosis)    Hearing loss, unspecified hearing loss type, unspecified laterality    Essential hypertension    Heart palpitations    CKD (chronic kidney disease) stage 2, GFR 60-89 ml/min         to read ekg is DR Brooke Tompkins         Result approved by Brooke Tompkins MD on 11/10/20 Echo:  Ejection Fraction       Date                     Value               Ref Range           Status                2019               60.0                %                   Corrected        ----------Stress Test:  No procedure found. ALLERGIES:   -- Augmentin (Amoxicillin-Pot Clavulanate) -- NAUSEA    --  Pt has tolerated Piperacillin-Tazobactam   -- Cephalosporins -- HIVES and PRURITUS    --  Pt has tolerated Ancef   -- Ibuprofen -- DIARRHEA   -- Lexapro -- Other (See Comments)    --  fatigue   -- Lisinopril -- Cough    --  Pt coughs when on medication.    -- Sulfa Antibiotics -- HIVES   -- Indomethacin -- NAUSEA   -- Montelukast -- Other (See Comments)    --  Agitation   -- Amlodipine -- Other (See Comments)    --  Ankle edema       Lab Results       Component                Value               Date                       WBC                      5.3 Started rocephin and azithromycin, for 2 days; change a/b to Vanc and Primaxin 4/28/17 (by dr. Berumen) as WBC rising  levalbuterol q 6 hours WA  BC x 2; no growth to date  Sputum cx not colelcted, no specimen  Follow CBC, trending up, originally thought due to steroids but the trend continues to rise significant > 30K. No fevers.     Cont on vanc, primaxin, and azithromycin. Transferring for infectious disease consultation. ?Atypical ?fungal  Never could obtain sputum cx. Would bronch be helpful? Transfer for pulm consultation   11/10/2020                 WBC                      3.9 (L)             08/16/2019                 RBC                      4.23                11/10/2020                 RBC                      4.53                08/16/2019                 HGB                      13.3                11/10/2020                 HGB                      12.1                08/16/2019                 HCT                      39.8                11/10/2020                 HCT                      38.1                08/16/2019                 MCHC                     33.4                11/10/2020                 MCHC                     31.8 (L)            08/16/2019                 SODIUM                   141                 08/19/2020                 SODIUM                   140                 07/07/2019                 POTASSIUM                3.2 (L)             12/21/2020                 POTASSIUM                3.4                 07/07/2019                 CHLORIDE                 107                 08/19/2020                 CHLORIDE                 105                 07/07/2019                 CO2                      27                  08/19/2020                 CO2                      27                  07/07/2019                 CO2                      27                  05/25/2012                 GLUCOSE                  98                  08/19/2020                 GLUCOSE                  107 (H)             07/07/2019                 BUN                      12                  08/19/2020                 BUN                      11                  08/16/2019                 CREATININE               0.80                08/19/2020                 CREATININE               0.74                07/07/2019                 GFRESTIMATE              81 (L)              08/19/2020                 GFRA                     >90                 07/07/2019                 GFRNA                    89 07/07/2019                 CALCIUM                  9.2                 08/19/2020                 CALCIUM                  8.4                 07/07/2019                 CALCIUM                  9.9                 05/25/2012                 PLT                      252                 11/10/2020                 PLT                      213                 08/16/2019                 PLT                      152                 03/20/2013                 PLT                      258                 08/26/2010                 PTT                      26                  11/10/2020                 PTT                      28                  04/22/2019                 INR                      1.0                 11/10/2020                 INR                      1.0                 07/11/2019             Past Medical History:  4/06: Abnormal Pap      Comment:  f/u q 6 months x 2 and HPV with one pap  No date: Allergic rhinitis, cause unspecified  No date: Chronic back pain  No date: CKD (chronic kidney disease) stage 2, GFR 60-89 ml/min  5/15/2019: Cyst and pseudocyst of pancreas  No date: Encounter for monitoring flecainide therapy  1991: Essential hypertension, benign  04/2019: Gallstone pancreatitis      Comment:  With pseudocysts  No date: Hearing loss      Comment:  bilateral  10/2016: History of SCC (squamous cell carcinoma) of skin      Comment:  right superior lateral knee  10/06: Human papillomavirus in conditions classified elsewhere and of   unspecified site      Comment:  HPV high risk on pap  No date: Irritable bowel syndrome  09/2010: Lymphocytic colitis      Comment:  Diarrhea. Started on Entocort. No date:  Other chronic pain      Comment:  back and right leg  No date: Palpitations      Comment:  ekg wnl, echo  childhood: PAST MEDICAL HISTORY      Comment:  mumps and varicella  No date: PONV (postoperative nausea and vomiting)  No date: RAD (reactive airway disease)  No date: Reflux esophagitis Comment:  mild  11/2010: Shingles  No date: Squamous cell skin cancer  No date: Unspecified hearing loss      Comment:  recurrent left ear infections with hearing loss    Past Surgical History:  03/14/2019: Back surgery      Comment:  Right SI fusion/[facility]ers GRF 3/14/19  1/2013: Carpal tunnel release  2001: Colonoscopy diagnostic      Comment:  Colonoscopy  1/04/2016: Colonoscopy diagnostic      Comment:  Colon in 10yrs. .   09/01/10: Colonoscopy w biopsy      Comment:  rpt 5 yrs,lymphocytic colitis,Diarrhea,Dr. BLEDSOE.  4/06: Colposcopy cervix & upper / adjacent vagina      Comment:  Colposcopy, f/u pap q 6 months x2 with HPV one pap  9/22/09: Echo heart stress      Comment:  Echocardiac, Stress,  EF = 55%  08/16/2019: Ercp      Comment:  Dr. Thomas Jamison, Stent Removal - Hx Choledocholithiasis  09/01/10: Esophagogastroduodenoscopy transoral flex w/bx single or   mult      Comment:  . GERD.  Dr. Rey Smith.   01/23/2013: Forearm/wrist surgery unlisted      Comment:  Rt wrist flexor tenosynovectomy   09/01/2020: Hand/finger surgery unlisted; Right      Comment:  Right long finger trigger release and mass removal                finger tip (Dr. Cecelia Lares )  1990: Hemorhoidectomy int ext single column group      Comment:  Hemorrhoidectomy  5/13/14: Hip surgery      Comment:  Rt arthroscopy-Dr. Nisha Tapia  1/15/13: Inj transforam epidural lumbar/sacral      Comment:  3RD B/L LUMBAR TF-PHI 1 WEEK  1/21/2013: Inj transforam epidural lumbar/sacral      Comment:  f/u Dr. Bennie Castleman  3/18/2013: Laminec/facetect/foramin,lumbar      Comment:  Laura Marcial L2-3 & L3-4/STL Dr. Bennie Castleman 3/18/13  06/24/2019: Laparoscopy, cholecystectomy      Comment:  christiano  1/09: Lasik surgery  5/4/15: Myocardial perf panel      Comment:  WNL, GXT  2/11/2016: Nasal scopy,remv totl ethmoid  2/11/2016: Nasal scopy,sphenoidotomy  3/2/2012: Nasal septoplasty w/ turbinoplasty  2/01: Repair of nasal septum Comment:  septoplasty and left endoscopic antrostomy with removal                of maxillary sinus contents and anterior and posterior                ethmoidectomy, Dr. Mary Beth Richardson  07/24/2017: Shoulder surg proc unlisted; Right      Comment:  Right  shoulder arthroscopic acromioplasty and DCE  (Dr. Justin Riley)  No date: Spinal cord stimulator implant      Comment:  with battery  8/01/08: Tympanoplas/antrotomy      Comment:  Done at [facility], Dr. Rogers Malcolm: Tympanoplasty      Comment:  three operative procedures on her left ear, exploratory                typanotomy with insertion of a TORP prosthesis, a 2nd                tympanoplasty with insertion of a TORP posthesis, then an               exploratory tympanotomy and a Type 4 tympanoplasty  05/06/2015: Upper arm/elbow surgery unlisted      Comment:  Right lateral epicondyle release  left: Us guided brst lesion asp/bx/wire loc      Comment:  Breast Asp, US Guided       Prior to Admission medications :  Medication fexofenadine (ALLEGRA) 60 MG tablet, Sig Take 60 mg by mouth 2 times daily. , Start Date , End Date , Taking? Yes, Authorizing Provider Outside Provider    Medication morphine, IMM REL, (MSIR) 15 MG tablet, Sig Take 1 tablet by mouth every 6 hours as needed for Pain. Do not start before January 9, 2021., Start Date 1/9/21, End Date , Taking? Yes, Authorizing Provider EMMA JohnsonNP    Medication morphine, IMM REL, (MSIR) 15 MG tablet, Sig Take 1 tablet by mouth every 6 hours as needed for pain. , Start Date 12/10/20, End Date , Taking? Yes, Authorizing Provider EMMA JohnsonNP    Medication gabapentin (NEURONTIN) 600 MG tablet, Sig Take 1 tablet by mouth every morning and 2 tablets at bedtime. , Start Date 12/9/20, End Date , Taking? Yes, Authorizing Provider Missy Roca APNP    Medication baclofen (LIORESAL) 10 MG tablet, Sig Take 1 tablet by mouth 3 times daily. , Start Date 11/17/20, End Date , Taking?  Yes, Authorizing Provider Reid Luis PA-C    Medication potassium CHLORIDE (KLOR-CON M) 20 MEQ floyd ER tablet, Sig Take 2 tablets by mouth daily. , Start Date 11/5/20, End Date , Taking? Yes, Authorizing Provider Sowmya Grande MD    Medication triamcinolone (ARISTOCORT) 0.1 % ointment, Sig Apply twice daily for 1 week, THEN daily for 1 week, THEN on weekends only. , Start Date 11/3/20, End Date , Taking? Yes, Authorizing Provider Lora Fraser MD    Medication losartan-hydrochlorothiazide (Hyzaar) 100-25 MG per tablet, Sig Take 1 tablet by mouth daily. For high blood pressure, Start Date 10/27/20, End Date , Taking? Yes, Authorizing Provider Sowmya Grande MD    Medication metoPROLOL succinate (Toprol XL) 50 MG 24 hr tablet, Sig Take 1 tablet by mouth daily at night for high blood pressure  Patient taking differently: Take 50 mg by mouth every evening. , Start Date 10/27/20, End Date , Taking? Yes, Authorizing Provider Sowmya Grande MD    Medication Fluticasone Propionate Rico Lemon) 93 MCG/ACT Exhaler Suspension, Sig Spray 1 spray in each nostril 2 times daily. , Start Date 10/23/20, End Date 10/23/21, Taking? Yes, Authorizing Provider Nas Ojeda MD    Medication venlafaxine XR (EFFEXOR XR) 75 MG 24 hr capsule, Sig Take 1 capsule by mouth daily in the morning for anxiety, Start Date 8/19/20, End Date , Taking? Yes, Authorizing Provider Sowmya Grande MD    Medication Probiotic Product (PROBIOTIC DAILY PO), Sig Take 1 capsule by mouth daily. Stop pre op 12/16/20. DOS 12/21/20, Start Date , End Date , Taking? Yes, Authorizing Provider Outside Provider    Medication Multiple Vitamin (MULTI-VITAMIN PO), Sig Take 1 tablet by mouth daily. Stop pre op 12/16/20. DOS 12/21/20, Start Date , End Date , Taking? Yes, Authorizing Provider Outside Provider    Medication cycloSPORINE (RESTASIS) 0.05 % ophthalmic emulsion, Sig Place 1 drop into both eyes 2 times daily. , Start Date 7/21/20, End Date , Taking?  Yes, Authorizing Provider     Medication baclofen 2%, cyclobenzaprine 2%, gabapentin 6%, ketamine 10%, lidocaine 5% compounded pain cream, Sig Apply 1-2 pumps to affected area up to three times daily, Start Date 6/30/20, End Date , Taking? Yes, Authorizing Provider TIFFANI Day    Medication Loratadine (CLARITIN PO), Sig Take 1 tablet by mouth 2 times daily. Indications: OTC , Start Date , End Date , Taking? Yes, Authorizing Provider Outside Provider    Medication LYSINE PO, Sig Take 2 tablets by mouth daily. Stop pre op 12/16/20. DOS 12/21/20, Start Date , End Date , Taking? Yes, Authorizing Provider Outside Provider    Medication Polyethyl Glycol-Propyl Glycol (SYSTANE) 0.4-0.3 % SOLN, Sig Apply 1 drop to eye 2 times daily as needed. For dry eyes. , Start Date 10/27/10, End Date , Taking? Yes, Authorizing Provider Margert Gitelman, MD    Medication CALCIUM + D 600-200 MG-IU PO TABS, Sig as directed  Patient taking differently: Stop pre op 12/16/20. DOS 12/21/20, Start Date 2/24/03, End Date , Taking? Yes, Authorizing Provider     Medication CARBOXYMethylcellulose (REFRESH PLUS) 0.5 % ophthalmic solution, Sig Place into both eyes 2 times daily. , Start Date , End Date , Taking? , Authorizing Provider Outside Provider    Medication azelastine (ASTELIN) 0.1 % nasal spray, Sig Spray 1 spray in each nostril 2 times daily as directed. Patient taking differently: Spray 1 spray in each nostril 2 times daily.  Not using, Start Date 7/24/20, End Date , Taking? , Authorizing Provider Karey Yan MD    Medication losartan (COZAAR) 100 MG tablet, Sig Take 1 tablet by mouth daily in the morning for high blood pressure, Start Date 6/30/20, End Date 9/23/20, Taking? , Authorizing Provider Margert Gitelman, MD    Medication Polyethylene Glycol 3350 (MIRALAX PO), Sig Take 17 g by mouth daily as needed. , Start Date , End Date , Taking? , Authorizing Provider Outside Provider    Medication ferrous sulfate 325 (65 FE) MG tablet, Sig Take 1 tablet by mouth daily (with breakfast). Do not start before May 2, 2019. Patient taking differently: Take 325 mg by mouth 2 times daily (with meals). Stop pre op 12/16/20. DOS 12/21/20, Start Date 5/2/19, End Date , Taking? , Authorizing Provider Moe Mondragon MD    Medication potassium chloride 10 MEQ ER tablet, Sig Take 3 tablets by mouth daily for potassium. , Start Date 11/21/18, End Date 9/9/20, Taking? , Authorizing Provider Elham Nobles MD            Relevant Problems   No relevant active problems       Physical Exam     Airway   Mallampati: I  TM Distance: >3 FB  Neck ROM: Full  TMJ Mobility: Good    Cardiovascular  Cardiovascular exam normal    Head Assessment  Head assessment: Normocephalic and Atraumatic    General Assessment  General Assessment: Alert and oriented    Pulmonary Exam  Pulmonary exam normal    Abdominal Exam    Patient Demonstrates:  Obese      Anesthesia Plan    ASA Status: 2    Anesthesia Type: General    Induction: Intravenous  Preferred Airway Type: ETT  Maintenance: Inhalational      Risks Discussed: Nausea, Vomiting, Intra-operative Awareness, Aspiration, Nerve Injury, Allergic Reaction, Need for Blood Transfusion, Sore Throat and Headache    Post-op Pain Management: Per Surgeon      Checklist  Reviewed: Lab Results, EKG, DNR Status, NPO Status, Allergies, Medications, Problem list and Past Med History    Informed Consent  The proposed anesthetic plan, including its risks and benefits, have been discussed with the Patient  - along with the risks and benefits of alternatives. Questions were encouraged and answered and the patient and/or representative understands and agrees to proceed.      Blood Products: Not Anticipated

## 2024-02-16 NOTE — ASSESSMENT & PLAN NOTE
What Type Of Note Output Would You Prefer (Optional)?: Standard Output started bipap, now sats drop to 65 without. His white count continues to climb although he is on vanc, zithromax and promaxin. He remains afebrile. He will be transferred to [facility] to an ICU bed as we need a higher level of care. I/D and pulmonology     What Is The Reason For Today's Visit?: Full Body Skin Examination What Is The Reason For Today's Visit? (Being Monitored For X): concerning skin lesions on an annual basis